# Patient Record
Sex: FEMALE | Race: WHITE | NOT HISPANIC OR LATINO | Employment: OTHER | URBAN - METROPOLITAN AREA
[De-identification: names, ages, dates, MRNs, and addresses within clinical notes are randomized per-mention and may not be internally consistent; named-entity substitution may affect disease eponyms.]

---

## 2019-12-17 ENCOUNTER — TELEPHONE (OUTPATIENT)
Dept: GYNECOLOGIC ONCOLOGY | Facility: CLINIC | Age: 49
End: 2019-12-17

## 2019-12-17 NOTE — TELEPHONE ENCOUNTER
----- Message from Carlos Garcia RN sent at 12/17/2019 12:18 PM CST -----  Would you schedule this patient for a consult with Dr. Zarate for pelvic mass on Monday 12/23 ? Contact her mom, Berta Acosta for appt. [phone]

## 2020-01-17 ENCOUNTER — TELEPHONE (OUTPATIENT)
Dept: GYNECOLOGIC ONCOLOGY | Facility: CLINIC | Age: 50
End: 2020-01-17

## 2020-01-17 NOTE — TELEPHONE ENCOUNTER
----- Message from Carlos Garcia RN sent at 1/16/2020  4:17 PM CST -----  Contact: Mrs. Acosta (Mother)      ----- Message -----  From: Darcie Cox  Sent: 1/16/2020   4:06 PM CST  To: Tessa Acevedo Staff    Name of Who is Calling: Mrs. Acosta (Mother)      What is the request in detail: Would like to speak to staff in regards to wanting to reschedule her appointment on 1/20/2020. States they won't be able to make it at 12:30 PM that day and wanted to see if she can come in at 2:30 PM. Please advise.       Can the clinic reply by [facility]: No      What Number to Call Back if not in [facility]: [phone]

## 2020-01-24 ENCOUNTER — TELEPHONE (OUTPATIENT)
Dept: ADMINISTRATIVE | Facility: OTHER | Age: 50
End: 2020-01-24

## 2020-01-27 ENCOUNTER — INITIAL CONSULT (OUTPATIENT)
Dept: GYNECOLOGIC ONCOLOGY | Facility: CLINIC | Age: 50
End: 2020-01-27
Payer: COMMERCIAL

## 2020-01-27 VITALS — BODY MASS INDEX: 23.4 KG/M2 | WEIGHT: 140.44 LBS | HEIGHT: 65 IN

## 2020-01-27 DIAGNOSIS — R97.1 ELEVATED CA-125: ICD-10-CM

## 2020-01-27 DIAGNOSIS — I82.412 ACUTE DEEP VEIN THROMBOSIS (DVT) OF FEMORAL VEIN OF LEFT LOWER EXTREMITY: ICD-10-CM

## 2020-01-27 DIAGNOSIS — R19.00 PELVIC MASS IN FEMALE: Primary | ICD-10-CM

## 2020-01-27 PROCEDURE — 99999 PR PBB SHADOW E&M-EST. PATIENT-LVL II: CPT | Mod: PBBFAC,,, | Performed by: OBSTETRICS & GYNECOLOGY

## 2020-01-27 PROCEDURE — 99205 OFFICE O/P NEW HI 60 MIN: CPT | Mod: S$PBB,,, | Performed by: OBSTETRICS & GYNECOLOGY

## 2020-01-27 PROCEDURE — 99205 PR OFFICE/OUTPT VISIT, NEW, LEVL V, 60-74 MIN: ICD-10-PCS | Mod: S$PBB,,, | Performed by: OBSTETRICS & GYNECOLOGY

## 2020-01-27 PROCEDURE — 99999 PR PBB SHADOW E&M-EST. PATIENT-LVL II: ICD-10-PCS | Mod: PBBFAC,,, | Performed by: OBSTETRICS & GYNECOLOGY

## 2020-01-27 PROCEDURE — 99212 OFFICE O/P EST SF 10 MIN: CPT | Mod: PBBFAC | Performed by: OBSTETRICS & GYNECOLOGY

## 2020-01-27 RX ORDER — DIAPER,BRIEF,ADULT, DISPOSABLE
1 EACH MISCELLANEOUS 2 TIMES DAILY
COMMUNITY

## 2020-01-27 RX ORDER — MULTIVITAMIN
1 TABLET ORAL 2 TIMES DAILY
COMMUNITY

## 2020-01-27 RX ORDER — MORPHINE SULFATE 15 MG/1
1 TABLET, FILM COATED, EXTENDED RELEASE ORAL 2 TIMES DAILY
COMMUNITY
Start: 2020-01-16

## 2020-01-27 RX ORDER — OXYCODONE AND ACETAMINOPHEN 10; 325 MG/1; MG/1
1 TABLET ORAL 3 TIMES DAILY
COMMUNITY
Start: 2015-04-09

## 2020-01-27 RX ORDER — ERGOCALCIFEROL 1.25 MG/1
1 CAPSULE ORAL 2 TIMES DAILY
COMMUNITY
Start: 2016-09-02

## 2020-01-27 RX ORDER — DEXAMETHASONE 2 MG/1
2 TABLET ORAL 2 TIMES DAILY
COMMUNITY
Start: 2019-11-04

## 2020-01-27 RX ORDER — CLONAZEPAM 2 MG/1
TABLET ORAL 2 TIMES DAILY
COMMUNITY
Start: 2016-10-05

## 2020-01-27 RX ORDER — HYDROXYZINE HYDROCHLORIDE 25 MG/1
1 TABLET, FILM COATED ORAL 2 TIMES DAILY
COMMUNITY
Start: 2016-09-28

## 2020-01-27 RX ORDER — SERTRALINE HYDROCHLORIDE 50 MG/1
1 TABLET, FILM COATED ORAL 2 TIMES DAILY
COMMUNITY
Start: 2016-09-22

## 2020-01-27 RX ORDER — WARFARIN 1 MG/1
3 TABLET ORAL
COMMUNITY
Start: 2020-01-06

## 2020-01-27 RX ORDER — LEVETIRACETAM 750 MG/1
750 TABLET ORAL 2 TIMES DAILY
COMMUNITY
Start: 2019-06-10

## 2020-01-27 NOTE — LETTER
January 27, 2020      Elan Salgado III, MD  [address]  [address]  [place] [address]           [address]  [address], [address]  [place] [address]  Phone: [phone]  Fax: [phone]          Patient: Daniella Acosta   MR Number: [id number]   YOB: 1970   Date of Visit: 1/27/2020       Dear Dr. Elan Salgado III:    Thank you for referring Daniella Acosta to me for evaluation. Attached you will find relevant portions of my assessment and plan of care.    If you have questions, please do not hesitate to call me. I look forward to following Daniella Acosta along with you.    Sincerely,    Curtis Zarate MD    Enclosure  CC:  No Recipients    If you would like to receive this communication electronically, please contact [email or web address] or [phone] to request more information on [employer] Link access.    For providers and/or their staff who would like to refer a patient to [facility], please contact us through our one-stop-shop provider referral line, [facility], at [phone].    If you feel you have received this communication in error or would no longer like to receive these types of communications, please e-mail [email or web address]

## 2020-01-27 NOTE — PROGRESS NOTES
Subjective:      Patient ID: Daniella Acosta is a 49 y.o. female.    Chief Complaint: Consult (Pelvic Mass (Dr. Salgado))      HPI  Here today at the request of Dr. Salgado due to recently detected pelvic mass.  Was sen by him in December after being diagnosed with LLE DVT.  He felt a pelvic mass so ordered TVUS and CT.  Both demonstrated a cystic and solid mass in the pelvis approx 13 cm in size.  There was no ascites.  CA-125 was 93. No FH breast/ovarian/colon cancers.  LMP 2-3 years ago.  [facility] open nick and brain surgery for GBM in 2010.  Has residual gait issues.  Currently on coumadin.  Denies pelvic pain.  Has no bladder or bowel issues.  Review of Systems   Constitutional: Negative for activity change, appetite change, chills, fatigue and fever.   HENT: Negative for hearing loss, mouth sores, nosebleeds, sore throat and tinnitus.    Eyes: Negative for visual disturbance.   Respiratory: Negative for cough, chest tightness, shortness of breath and wheezing.    Cardiovascular: Negative for chest pain and leg swelling.   Gastrointestinal: Negative for abdominal distention, abdominal pain, blood in stool, constipation, diarrhea, nausea and vomiting.   Genitourinary: Negative for dysuria, flank pain, frequency, hematuria, pelvic pain, vaginal bleeding, vaginal discharge and vaginal pain.   Musculoskeletal: Negative for arthralgias and back pain.   Skin: Negative for rash.   Neurological: Negative for dizziness, seizures, syncope, weakness and numbness.   Hematological: Does not bruise/bleed easily.   Psychiatric/Behavioral: Negative for confusion and sleep disturbance. The patient is not nervous/anxious.        Past Medical History:   Diagnosis Date    DVT (deep venous thrombosis)     Seizure disorder      Past Surgical History:   Procedure Laterality Date    APPENDECTOMY      BRAIN SURGERY       Family History   Problem Relation Age of Onset    Hypertension Mother     Gout Father      Social History      Socioeconomic History    Marital status: Single     Spouse name: Not on file    Number of children: Not on file    Years of education: Not on file    Highest education level: Not on file   Occupational History    Not on file   Social Needs    Financial resource strain: Not on file    Food insecurity:     Worry: Not on file     Inability: Not on file    Transportation needs:     Medical: Not on file     Non-medical: Not on file   Tobacco Use    Smoking status: Former Smoker     Years: 10.00     Types: Cigarettes     Last attempt to quit: 1/27/2010     Years since quitting: 10.0    Smokeless tobacco: Never Used    Tobacco comment: Pt reports smoking approx 4 cigarettes/day   Substance and Sexual Activity    Alcohol use: Not Currently    Drug use: Not Currently    Sexual activity: Not on file   Lifestyle    Physical activity:     Days per week: Not on file     Minutes per session: Not on file    Stress: Not on file   Relationships    Social connections:     Talks on phone: Not on file     Gets together: Not on file     Attends Gnosticism service: Not on file     Active member of club or organization: Not on file     Attends meetings of clubs or organizations: Not on file     Relationship status: Not on file   Other Topics Concern    Not on file   Social History Narrative    Not on file     Current Outpatient Medications   Medication Sig    clonazePAM (KLONOPIN) 2 MG Tab Take by mouth 2 (two) times daily. Pt takes 1/2 tablet am and 1 tablet pm    dexAMETHasone (DECADRON) 2 MG tablet Take 2 mg by mouth 2 (two) times daily.    ergocalciferol (ERGOCALCIFEROL) 50,000 unit Cap Take 1 capsule by mouth 2 (two) times daily.    hydrOXYzine HCl (ATARAX) 25 MG tablet Take 1 tablet by mouth 2 (two) times daily.    levETIRAcetam (KEPPRA) 750 MG Tab Take 750 mg by mouth 2 (two) times daily.    lysine (L-LYSINE) 500 mg Tab Take 1 tablet by mouth 2 (two) times daily.    morphine (MS CONTIN) 15 MG 12 hr  tablet Take 1 tablet by mouth 2 (two) times daily.    multivitamin (THERAGRAN) per tablet Take 1 tablet by mouth 2 (two) times daily.    oxyCODONE-acetaminophen (PERCOCET)  mg per tablet Take 1 tablet by mouth 3 (three) times daily.    sertraline (ZOLOFT) 50 MG tablet Take 1 tablet by mouth 2 (two) times daily. Pt take 1.5 tablets in am and 1 tablet in pm    warfarin (COUMADIN) 1 MG tablet Take 3 tablets by mouth every Mon, Wed, Fri, Sun. Monday,Wed, Frid, Sun-TID Tues,Thurs,Sat-4x daily     No current facility-administered medications for this visit.      Review of patient's allergies indicates:   Allergen Reactions    Sulfa (sulfonamide antibiotics) Rash       Objective:   Physical Exam:   Constitutional: She is oriented to person, place, and time. She appears well-developed and well-nourished. No distress.    HENT:   Head: Normocephalic and atraumatic.    Eyes: No scleral icterus.    Neck: Normal range of motion. Neck supple.    Cardiovascular: Normal rate and intact distal pulses.  Exam reveals no cyanosis and no edema.     Pulmonary/Chest: Effort normal. No respiratory distress. She exhibits no tenderness.        Abdominal: Soft. Normal appearance. She exhibits mass. She exhibits no distension, no fluid wave and no ascites. There is no tenderness. There is no rigidity, no rebound and no guarding. No hernia.   Mass palpable in lower pelvis, mobile     Genitourinary: Rectum normal, vagina normal and uterus normal. Pelvic exam was performed with patient supine. There is no rash, tenderness or lesion on the right labia. There is no rash, tenderness or lesion on the left labia. Uterus is not deviated, not enlarged, not fixed, not tender, not hosting fibroids and not experiencing uterine prolapse. Cervix is normal. Right adnexum displays no mass, no tenderness and no fullness. Left adnexum displays mass and fullness. Left adnexum displays no tenderness. No bleeding in the vagina. No vaginal discharge  found. Labial bartholins normal.Cervix exhibits no motion tenderness, no discharge and no friability.   Genitourinary Comments: Mass smooth and mobile, does not feel fixed           Musculoskeletal: Normal range of motion and moves all extremeties. She exhibits no edema.      Lymphadenopathy:     She has no cervical adenopathy.        Right: No inguinal adenopathy present.        Left: No inguinal adenopathy present.    Neurological: She is alert and oriented to person, place, and time.    Skin: Skin is warm. No rash noted. No cyanosis or erythema.    Psychiatric: She has a normal mood and affect. Thought content normal.       Assessment:     1. Pelvic mass in female    2. Elevated CA-125    3. Acute deep vein thrombosis (DVT) of femoral vein of left lower extremity        Plan:       Explained exam and radiologic findings to the patient and her mother.  Given size, DVT due to vein compression, and elevated CA-125, I recommend excision.  I think we can do this robotically, and counseled them as such.  Since she is , plan RALH/BSO/Mass resection and possible staging. The risks, benefits, and indications of the procedure were discussed with the patient and her family member.  These included bleeding, infection, damage to surrounding tissues, conversion to laparotomy, and the possibility of major complications including death.  She voiced understanding, all questions were answered and consents were signed. Will arrange for her to have IVC filter placed prior to surgery.  Will also discuss rationale behind coumadin versus Xarelto with her primary MD.

## 2020-01-27 NOTE — Clinical Note
Tonny Rodriguez:Hope you are well.  Is there any way you can place an IVC filter in this lady?  Pretty extensive clot in the left venous system due to pelvic mass.  I think she was imaged at [facility] so I am happy to repeat imaging on her if needed.  She is also on Coumadin so let me know how you want to manage that.  Thanks in advance.  IVÁN

## 2020-01-29 ENCOUNTER — TELEPHONE (OUTPATIENT)
Dept: GYNECOLOGIC ONCOLOGY | Facility: CLINIC | Age: 50
End: 2020-01-29

## 2020-01-29 ENCOUNTER — TELEPHONE (OUTPATIENT)
Dept: ADMINISTRATIVE | Facility: OTHER | Age: 50
End: 2020-01-29

## 2020-01-29 DIAGNOSIS — D49.59 OVARIAN NEOPLASM: ICD-10-CM

## 2020-01-29 NOTE — TELEPHONE ENCOUNTER
----- Message from Darcie Perezmfield sent at 1/29/2020 11:08 AM CST -----  Contact: Dr. Tidwell  Name of Who is Calling: Dr. Tidwell      What is the request in detail: Would like to speak to Dr. Zarate in regards to a procedure she is supposed to have in [place].       Can the clinic reply by [facility]: No      What Number to Call Back if not in [facility]: [phone]

## 2020-01-30 ENCOUNTER — TELEPHONE (OUTPATIENT)
Dept: ADMINISTRATIVE | Facility: OTHER | Age: 50
End: 2020-01-30

## 2020-02-05 ENCOUNTER — TELEPHONE (OUTPATIENT)
Dept: ADMINISTRATIVE | Facility: OTHER | Age: 50
End: 2020-02-05

## 2020-02-10 ENCOUNTER — TELEPHONE (OUTPATIENT)
Dept: RADIOLOGY | Facility: HOSPITAL | Age: 50
End: 2020-02-10

## 2020-02-11 ENCOUNTER — HOSPITAL ENCOUNTER (OUTPATIENT)
Dept: RADIOLOGY | Facility: HOSPITAL | Age: 50
Discharge: HOME | End: 2020-02-11
Attending: OBSTETRICS & GYNECOLOGY
Payer: MEDICARE

## 2020-02-11 DIAGNOSIS — D49.59 OVARIAN NEOPLASM: ICD-10-CM

## 2020-02-11 PROCEDURE — 74177 CT ABD & PELVIS W/CONTRAST: CPT | Mod: TC

## 2020-02-11 PROCEDURE — 74177 CT ABDOMEN PELVIS WITH CONTRAST: ICD-10-PCS | Mod: 26,,, | Performed by: RADIOLOGY

## 2020-02-11 PROCEDURE — 74177 CT ABD & PELVIS W/CONTRAST: CPT | Mod: 26,,, | Performed by: RADIOLOGY

## 2020-02-11 PROCEDURE — 25500020 PHARM REV CODE 255: Performed by: OBSTETRICS & GYNECOLOGY

## 2020-02-11 RX ADMIN — IOHEXOL 30 ML: 350 INJECTION, SOLUTION INTRAVENOUS at 09:02

## 2020-02-11 RX ADMIN — IOHEXOL 75 ML: 350 INJECTION, SOLUTION INTRAVENOUS at 10:02

## 2020-02-14 ENCOUNTER — TELEPHONE (OUTPATIENT)
Dept: GYNECOLOGIC ONCOLOGY | Facility: CLINIC | Age: 50
End: 2020-02-14

## 2020-02-14 DIAGNOSIS — R19.00 PELVIC MASS IN FEMALE: Primary | ICD-10-CM

## 2020-02-14 NOTE — TELEPHONE ENCOUNTER
----- Message from Delores Richmond sent at 2/14/2020 11:31 AM CST -----  Contact: patient mother  [phone]-please call above patient mother at number in message need to speak with the nurse concerning test results waiting on a call back thanks.

## 2020-02-19 DIAGNOSIS — R19.00 PELVIC MASS IN FEMALE: Primary | ICD-10-CM

## 2020-02-20 ENCOUNTER — TELEPHONE (OUTPATIENT)
Dept: RADIOLOGY | Facility: HOSPITAL | Age: 50
End: 2020-02-20

## 2020-02-21 DIAGNOSIS — R19.00 PELVIC MASS IN FEMALE: ICD-10-CM

## 2020-02-21 DIAGNOSIS — I82.412 ACUTE DEEP VEIN THROMBOSIS (DVT) OF FEMORAL VEIN OF LEFT LOWER EXTREMITY: Primary | ICD-10-CM

## 2020-02-28 ENCOUNTER — TELEPHONE (OUTPATIENT)
Dept: GYNECOLOGIC ONCOLOGY | Facility: CLINIC | Age: 50
End: 2020-02-28

## 2020-02-28 NOTE — TELEPHONE ENCOUNTER
----- Message from Margarita Edouard sent at 2/28/2020  4:52 PM CST -----  Type:  Patient Returning Call    Who Called: pt mom   Who Left Message for Patient: pt mom Inge  Does the patient know what this is regarding?: mom want a call   Would the patient rather a call back or a response via [facility]?  Call   Best Call Back Number:[phone]  Additional Information:  Call back

## 2020-03-02 ENCOUNTER — TELEPHONE (OUTPATIENT)
Dept: RADIOLOGY | Facility: HOSPITAL | Age: 50
End: 2020-03-02

## 2020-03-05 ENCOUNTER — HOSPITAL ENCOUNTER (OUTPATIENT)
Facility: HOSPITAL | Age: 50
Discharge: HOME | End: 2020-03-05
Attending: RADIOLOGY | Admitting: RADIOLOGY
Payer: MEDICARE

## 2020-03-05 VITALS
DIASTOLIC BLOOD PRESSURE: 79 MMHG | HEART RATE: 81 BPM | BODY MASS INDEX: 21.66 KG/M2 | HEIGHT: 65 IN | SYSTOLIC BLOOD PRESSURE: 109 MMHG | RESPIRATION RATE: 17 BRPM | WEIGHT: 130 LBS | TEMPERATURE: 98 F | OXYGEN SATURATION: 96 %

## 2020-03-05 DIAGNOSIS — R19.00 PELVIC MASS IN FEMALE: ICD-10-CM

## 2020-03-05 DIAGNOSIS — R19.00 PELVIC MASS: ICD-10-CM

## 2020-03-05 PROCEDURE — 63600175 PHARM REV CODE 636 W HCPCS: Performed by: RADIOLOGY

## 2020-03-05 PROCEDURE — C1769 GUIDE WIRE: HCPCS | Performed by: RADIOLOGY

## 2020-03-05 PROCEDURE — C1880 VENA CAVA FILTER: HCPCS | Performed by: RADIOLOGY

## 2020-03-05 PROCEDURE — 63600175 PHARM REV CODE 636 W HCPCS

## 2020-03-05 PROCEDURE — 25500020 PHARM REV CODE 255

## 2020-03-05 DEVICE — OPTION ELITE RETRIEVABLE VENA CAVA FILTER SUITABLE FOR JUGULAR OR FEMORAL DELIVERY - STERILIZED AT STERIGENICS
Type: IMPLANTABLE DEVICE | Site: VENA CAVA | Status: FUNCTIONAL
Brand: OPTION ELITE RETRIEVABLE VENA CAVA FILTER

## 2020-03-05 RX ORDER — HYDROMORPHONE HYDROCHLORIDE 1 MG/ML
INJECTION, SOLUTION INTRAMUSCULAR; INTRAVENOUS; SUBCUTANEOUS
Status: COMPLETED | OUTPATIENT
Start: 2020-03-05 | End: 2020-03-05

## 2020-03-05 RX ORDER — FENTANYL CITRATE 50 UG/ML
INJECTION, SOLUTION INTRAMUSCULAR; INTRAVENOUS
Status: COMPLETED | OUTPATIENT
Start: 2020-03-05 | End: 2020-03-05

## 2020-03-05 RX ORDER — MIDAZOLAM HYDROCHLORIDE 1 MG/ML
INJECTION INTRAMUSCULAR; INTRAVENOUS
Status: COMPLETED | OUTPATIENT
Start: 2020-03-05 | End: 2020-03-05

## 2020-03-05 RX ADMIN — HYDROMORPHONE HYDROCHLORIDE 1 MG: 1 INJECTION, SOLUTION INTRAMUSCULAR; INTRAVENOUS; SUBCUTANEOUS at 01:03

## 2020-03-05 RX ADMIN — MIDAZOLAM HYDROCHLORIDE 2 MG: 1 INJECTION, SOLUTION INTRAMUSCULAR; INTRAVENOUS at 01:03

## 2020-03-05 RX ADMIN — FENTANYL CITRATE 100 MCG: 50 INJECTION, SOLUTION INTRAMUSCULAR; INTRAVENOUS at 01:03

## 2020-03-05 RX ADMIN — MIDAZOLAM HYDROCHLORIDE 1 MG: 1 INJECTION, SOLUTION INTRAMUSCULAR; INTRAVENOUS at 01:03

## 2020-03-05 NOTE — PLAN OF CARE
Patient discharged home with mom in private vehicle, IV removed, reviewed discharge instructions, verbalized understanding, covaderm clean, dry and intact to right groin, ambulated to restroom with steady gait.

## 2020-03-05 NOTE — DISCHARGE INSTRUCTIONS
"Post-op     1. DIET: It is advisable for you to follow a diet that limits the intake of salt, sugar, saturated fats and cholesterol.     2. FOR THE NEXT 24 HOURS:   · For the next 8 hours, you should be watched by a responsible adult. This person should make sure your condition is not getting worse.   · Don't drink any alcohol for the next 24 hours.  · Don't drive, operate dangerous machinery, or make important business or personal decisions during the next 24 hours.  · Your healthcare provider may tell you not to take any medicine by mouth for pain or sleep in the next 4 hours. These medicines may react with the medicines you were given in the hospital. This could cause a much stronger response than usual.       3. ACTIVITY:                                Day of discharge:             NO vigorous activity, lifting or straining                                                   Day after discharge:         Avoid heavy lifting (up to 10 lbs)                                                                        The day after discharge you may shower,                but avoid tub baths for 5 days                                                                         Wash site gently with soap and water        NO powder or lotion to your procedure site.                 Before you shower, remove dressing, apply       bandaid if desired                                                                                                                                                                            2nd day after discharge:  Resume normal activities                                                                         Exercise program as instructed      4. WOUND CARE: It is not unusual to have a small amount of bruising appear in the groin area. It is also common to have a tender "knot" develop beneath the skin at the puncture site in the groin. This is scar tissue only and is not a cause for concern or alarm. " "This tender knot may take several weeks to fully resolve. The bruise will usually spread over several days. If the lump gets bigger, call you doctor immediately.    5. DISCOMFORT: For general discomfort at the puncture site, you may take 1 or 2 Acetaminophen (Tylenol) tablets every 4 hours as needed. (Do not take more than 4000 mg a day)                 6. CALL YOUR HEALTHCARE PROVIDER IF YOU START TO HAVE THE FOLLOWING SYMPTOMS:        1. Problems with the affected leg: Pain, discomfort, loss of warmth, numbness                     or tingling                                                                                                                            2. Problems at the groin site: Bleeding, pain that is sudden/sharp/persistent,                   swelling at site or a change in "lump" size, increased redness or drainage at                     puncture site                                                               3. High fever (101 degrees or higher)       4.  Drowsiness that doesn't get better       5. Weakness or dizziness that doesn't get better            6. Repeated vomiting        7. GO TO  THE EMERGENCY ROOM OR CALL 911 IF YOU HAVE: Chest pains or discomforts not relieved with 3 nitroglycerin doses (sublingual tablets or spray), numbness or severe pain or if your foot or leg becomes cold or discolored or uncontrolled bleeding from site (apply direct pressure above site).  "

## 2020-03-05 NOTE — DISCHARGE SUMMARY
Radiology Discharge Summary      Hospital Course: No complications    Admit Date: 3/5/2020  Discharge Date: 03/05/2020     Instructions Given to Patient: Yes  Diet: regular diet and Resume prior diet  Activity: activity as tolerated and no driving for today    Description of Condition on Discharge: Stable  Vital Signs (Most Recent): Temp: 97.7 °F (36.5 °C) (03/05/20 1216)  Pulse: 80 (03/05/20 1216)  Resp: 16 (03/05/20 1216)  BP: 100/74 (03/05/20 1217)  SpO2: 96 % (03/05/20 1216)    Discharge Disposition: Home    Discharge Diagnosis: dvt;  preop filter for surgery     Follow-up: with dr liane Enamorado MD  Staff Radiologist  Department of Radiology  Pager: [phone]

## 2020-03-05 NOTE — H&P
[facility] -   History & Physical    Subjective:      Chief Complaint/Reason for Admission: DVT    Daniella Acosta is a 49 y.o. female.    Past Medical History:   Diagnosis Date    DVT (deep venous thrombosis)     Glioblastoma     Seizure disorder      Past Surgical History:   Procedure Laterality Date    APPENDECTOMY      BRAIN SURGERY       Family History   Problem Relation Age of Onset    Hypertension Mother     Gout Father      Social History     Tobacco Use    Smoking status: Former Smoker     Years: 10.00     Types: Cigarettes     Last attempt to quit: 1/27/2010     Years since quitting: 10.1    Smokeless tobacco: Never Used    Tobacco comment: Pt reports smoking approx 4 cigarettes/day   Substance Use Topics    Alcohol use: Not Currently    Drug use: Not Currently       PTA Medications   Medication Sig    clonazePAM (KLONOPIN) 2 MG Tab Take by mouth 2 (two) times daily. Pt takes 1/2 tablet am and 1 tablet pm    dexAMETHasone (DECADRON) 2 MG tablet Take 2 mg by mouth 2 (two) times daily.    ergocalciferol (ERGOCALCIFEROL) 50,000 unit Cap Take 1 capsule by mouth 2 (two) times daily.    hydrOXYzine HCl (ATARAX) 25 MG tablet Take 1 tablet by mouth 2 (two) times daily.    levETIRAcetam (KEPPRA) 750 MG Tab Take 750 mg by mouth 2 (two) times daily.    lysine (L-LYSINE) 500 mg Tab Take 1 tablet by mouth 2 (two) times daily.    morphine (MS CONTIN) 15 MG 12 hr tablet Take 1 tablet by mouth 2 (two) times daily.    multivitamin (THERAGRAN) per tablet Take 1 tablet by mouth 2 (two) times daily.    oxyCODONE-acetaminophen (PERCOCET)  mg per tablet Take 1 tablet by mouth 3 (three) times daily.    sertraline (ZOLOFT) 50 MG tablet Take 1 tablet by mouth 2 (two) times daily. Pt take 1.5 tablets in am and 1 tablet in pm    warfarin (COUMADIN) 1 MG tablet Take 3 tablets by mouth every Mon, Wed, Fri, Sun. Monday,Wed, Frid, Sun-TID Tues,Thurs,Sat-4x daily     Review of patient's  allergies indicates:   Allergen Reactions    Sulfa (sulfonamide antibiotics) Rash        Review of Systems   Eyes: Negative.    Respiratory: Negative.    Cardiovascular: Negative.    Gastrointestinal: Negative.        Objective:      Vital Signs (Most Recent)  Temp: 97.7 °F (36.5 °C) (03/05/20 1216)  Pulse: 80 (03/05/20 1216)  Resp: 16 (03/05/20 1216)  BP: 100/74 (03/05/20 1217)  SpO2: 96 % (03/05/20 1216)    Vital Signs Range (Last 24H):  Temp:  [97.7 °F (36.5 °C)]   Pulse:  [80]   Resp:  [16]   BP: (100)/(74)   SpO2:  [96 %]     Physical Exam   Constitutional: She appears well-nourished.   HENT:   Head: Atraumatic.   Eyes: EOM are normal.   Abdominal: Soft.       Data Review:    CBC:   Lab Results   Component Value Date    WBC 12.79 (H) 03/05/2020    RBC 3.99 (L) 03/05/2020    HGB 11.1 (L) 03/05/2020    HCT 36.0 (L) 03/05/2020     (H) 03/05/2020      ECG: no prior ECG.     Assessment:      Active Hospital Problems    Diagnosis  POA    Pelvic mass [R19.00]  Yes     Priority: High      Resolved Hospital Problems   No resolved problems to display.       Plan:    ivc filter

## 2020-03-12 ENCOUNTER — TELEPHONE (OUTPATIENT)
Dept: GYNECOLOGIC ONCOLOGY | Facility: CLINIC | Age: 50
End: 2020-03-12

## 2020-03-12 DIAGNOSIS — R19.00 PELVIC MASS IN FEMALE: Primary | ICD-10-CM

## 2020-03-12 NOTE — TELEPHONE ENCOUNTER
----- Message from Cynthia Peck sent at 3/12/2020  9:31 AM CDT -----  Contact: Inge pt mother called   Inge pt mother called and said  is supposed to schedule a surgery for Pt     Inge wants someone to call her regarding surgery     Inge can be reached at [phone]

## 2020-03-17 ENCOUNTER — TELEPHONE (OUTPATIENT)
Dept: PREADMISSION TESTING | Facility: HOSPITAL | Age: 50
End: 2020-03-17

## 2020-03-17 RX ORDER — HYDROGEN PEROXIDE 3 %
20 SOLUTION, NON-ORAL MISCELLANEOUS
COMMUNITY

## 2020-03-17 NOTE — TELEPHONE ENCOUNTER
This chart show her as in [facility] CATH LAB. Spoke with patient mother. This patient in a living center and the mother will have her with her.

## 2020-03-17 NOTE — PRE-PROCEDURE INSTRUCTIONS
Preop and medication instructions given and reviewed;  Patient verbalized understanding.  Patient notified Anesthesia will conduct interview via phone.

## 2020-03-17 NOTE — TELEPHONE ENCOUNTER
----- Message from Erin Alvarado RN sent at 3/17/2020 10:19 AM CDT -----  Surgery date: 3/24/2020  PreOp appt: N/A    Please call Pt and schedule the following preop appts:    POC (via phone)  Lab    Thank you!  Erin

## 2020-03-17 NOTE — ANESTHESIA PAT ROS NOTE
03/17/2020  Daniella Acosta is a 49 y.o., female.      Pre-op Assessment         Review of Systems  Anesthesia Hx:  Hx of Anesthetic complications HARD TO SEDATE-pt on morphine and percocet daily for chronic pain  Denies Family Hx of Anesthesia complications.  Personal Hx of Anesthesia complications   Social:  Former Smoker, No Alcohol Use    Hematology/Oncology:         -- Transfusion: -- Transfusion Hx (no complications) (Vit K def at birth,required transfusion): --  Cancer in past history (Hx glioblastoma 2010-stable at this time): s/p surgery, s/p chemotherapy and s/p radiation therapy   Oncology Comments: Pelvic mass/elevated CA-125     EENT/Dental:EENT/Dental Normal   Cardiovascular:    Denies Angina.  Functional Capacity 3 METS  Deep Venous Thrombosis (DVT) (pt had IVC filter inserted last week), recent DVT (<6 months), left lower extremity, right lower extremity    Pulmonary:  Pulmonary Normal  Denies Shortness of breath.  Denies Recent URI.    Renal/:  Renal/ Normal     Hepatic/GI:  Esophageal / Stomach Disorders Gerd Controlled by PRN antireflux medication.    Musculoskeletal:  Musculoskeletal General/Symptoms: Functional capacity is ambulatory with walker.    Neurological:  Dx of Headaches Pain , onset is chronic , location of head & toe , alleviating factors are morphine 2/day, percocet 3/day. Seizure Disorder , Most recent seizure occurred 1-6  months ago  Brain Tumor, Glioblastoma (2010 radiation, surgery  & chemo-stable now)   Endocrine:  Endocrine Normal    Dermatological:  Pinky toe black  Psych:  Psychiatric Normal           Physical Exam   Airway/Jaw/Neck:  Airway Findings: Jaw/Neck Findings: (per pt) Neck ROM: Normal ROM      Dental:  Dental Findings: In tact           3/20/20 POC appt completed by phone. Coumadin instructions/lab results noted in media.  3/23/20 pt did not go to  3/20 lab appt, eleazar get CMP and T & S am of surgery.    Anesthesia Assessment: Preoperative EQUATION    Planned Procedure: Procedure(s) (LRB):  XI ROBOTIC HYSTERECTOMY (N/A)  XI ROBOTIC SALPINGO-OOPHORECTOMY (Bilateral)  Requested Anesthesia Type:General  Surgeon: Curtis Zarate MD  Service: OB/GYN  Known or anticipated Date of Surgery:3/24/2020    Surgeon notes: reviewed    Previous anesthesia records:Not available    Last PCP note: within 1 month , outside [facility]   Subspecialty notes: Gyn/ONC, Hematology/Oncology, Pain Management, Vascular Surgery    Other important co-morbidities:Per Epic:  Hx DVT, Sz disorder, IVC filter (3/20), Hx Glioblastoma      Tests already available:  Available tests,  within 1 month , within [facility] .  3/5/2020 PT/INR, PTT, CBC, PTA; CE: 7/17/2019 EKG (no tracing)            Instructions given. (See in Nurse's note)    Optimization:  Anesthesia Preop Clinic Assessment  Indicated. (via phone)    Medical Opinion Indicated.      Plan:    Testing:  CMP, PT/INR, PTT and T&S   Pre-anesthesia  visit       Visit focus: concerns in complex and/or prolonged anesthesia, position other than supine, post-operative pain control for chronic pain patient     Consultation:Patient's PCP for a statement of optimization and Coumadin instructions from OS Dr. Tidwell     Patient  has previously scheduled Medical Appointment: Not at this time prior to surgery    Navigation: Tests Scheduled.              Consults scheduled.             Results will be tracked by Preop Clinic.    3/19/2020  OS records received from PCP, including most recent lab, COumadin instructions, EKG and visit note from 3/18/2020.  (scanned to media)

## 2020-03-23 ENCOUNTER — ANESTHESIA EVENT (OUTPATIENT)
Dept: SURGERY | Facility: HOSPITAL | Age: 50
DRG: 737 | End: 2020-03-23
Payer: MEDICARE

## 2020-03-24 ENCOUNTER — HOSPITAL ENCOUNTER (INPATIENT)
Facility: HOSPITAL | Age: 50
LOS: 1 days | Discharge: HOME | DRG: 737 | End: 2020-03-25
Attending: OBSTETRICS & GYNECOLOGY | Admitting: OBSTETRICS & GYNECOLOGY
Payer: MEDICARE

## 2020-03-24 ENCOUNTER — TELEPHONE (OUTPATIENT)
Dept: GYNECOLOGIC ONCOLOGY | Facility: CLINIC | Age: 50
End: 2020-03-24

## 2020-03-24 ENCOUNTER — ANESTHESIA (OUTPATIENT)
Dept: SURGERY | Facility: HOSPITAL | Age: 50
DRG: 737 | End: 2020-03-24
Payer: MEDICARE

## 2020-03-24 DIAGNOSIS — R97.1 ELEVATED CA-125: ICD-10-CM

## 2020-03-24 DIAGNOSIS — Z90.710 STATUS POST HYSTERECTOMY: ICD-10-CM

## 2020-03-24 DIAGNOSIS — R19.00 PELVIC MASS IN FEMALE: ICD-10-CM

## 2020-03-24 DIAGNOSIS — Z90.710 STATUS POST HYSTERECTOMY: Primary | ICD-10-CM

## 2020-03-24 DIAGNOSIS — Z01.818 PREOPERATIVE TESTING: ICD-10-CM

## 2020-03-24 DIAGNOSIS — I82.412 ACUTE DEEP VEIN THROMBOSIS (DVT) OF FEMORAL VEIN OF LEFT LOWER EXTREMITY: ICD-10-CM

## 2020-03-24 DIAGNOSIS — L81.9 DISCOLORATION OF SKIN OF TOE: ICD-10-CM

## 2020-03-24 DIAGNOSIS — R19.00 PELVIC MASS: ICD-10-CM

## 2020-03-24 PROBLEM — I82.512 CHRONIC DEEP VEIN THROMBOSIS (DVT) OF FEMORAL VEIN OF LEFT LOWER EXTREMITY: Status: ACTIVE | Noted: 2020-01-27

## 2020-03-24 PROBLEM — F32.A ANXIETY AND DEPRESSION: Status: ACTIVE | Noted: 2020-03-24

## 2020-03-24 PROBLEM — C71.9 GLIOBLASTOMA MULTIFORME: Status: ACTIVE | Noted: 2020-03-24

## 2020-03-24 PROBLEM — I96 GANGRENE OF TOE OF LEFT FOOT: Status: ACTIVE | Noted: 2020-03-24

## 2020-03-24 PROBLEM — F41.9 ANXIETY AND DEPRESSION: Status: ACTIVE | Noted: 2020-03-24

## 2020-03-24 LAB
ABO + RH BLD: NORMAL
APTT BLDCRRT: 21 SEC (ref 21–32)
BLD GP AB SCN CELLS X3 SERPL QL: NORMAL
INR PPP: 1 (ref 0.8–1.2)
POCT GLUCOSE: 83 MG/DL (ref 70–110)
PROTHROMBIN TIME: 10.3 SEC (ref 9–12.5)

## 2020-03-24 PROCEDURE — 63600175 PHARM REV CODE 636 W HCPCS: Performed by: STUDENT IN AN ORGANIZED HEALTH CARE EDUCATION/TRAINING PROGRAM

## 2020-03-24 PROCEDURE — 58571 PR LAPAROSCOPY W TOT HYSTERECTUTERUS <=250 GRAM  W TUBE/OVARY: ICD-10-PCS | Mod: GC,,, | Performed by: OBSTETRICS & GYNECOLOGY

## 2020-03-24 PROCEDURE — 88341 PR IHC OR ICC EACH ADD'L SINGLE ANTIBODY  STAINPR: ICD-10-PCS | Mod: 26,,, | Performed by: PATHOLOGY

## 2020-03-24 PROCEDURE — 88305 TISSUE EXAM BY PATHOLOGIST: CPT | Performed by: PATHOLOGY

## 2020-03-24 PROCEDURE — 88305 TISSUE EXAM BY PATHOLOGIST: CPT | Mod: 26,,, | Performed by: PATHOLOGY

## 2020-03-24 PROCEDURE — 58571 TLH W/T/O 250 G OR LESS: CPT | Mod: AS,GC,, | Performed by: NURSE PRACTITIONER

## 2020-03-24 PROCEDURE — 88342 IMHCHEM/IMCYTCHM 1ST ANTB: CPT | Mod: 26,,, | Performed by: PATHOLOGY

## 2020-03-24 PROCEDURE — 88341 IMHCHEM/IMCYTCHM EA ADD ANTB: CPT | Mod: 26,,, | Performed by: PATHOLOGY

## 2020-03-24 PROCEDURE — 99223 1ST HOSP IP/OBS HIGH 75: CPT | Mod: ,,, | Performed by: SURGERY

## 2020-03-24 PROCEDURE — 86900 BLOOD TYPING SEROLOGIC ABO: CPT

## 2020-03-24 PROCEDURE — 37000009 HC ANESTHESIA EA ADD 15 MINS: Performed by: OBSTETRICS & GYNECOLOGY

## 2020-03-24 PROCEDURE — 58571 PR LAPAROSCOPY W TOT HYSTERECTUTERUS <=250 GRAM  W TUBE/OVARY: ICD-10-PCS | Mod: AS,GC,, | Performed by: NURSE PRACTITIONER

## 2020-03-24 PROCEDURE — 82962 GLUCOSE BLOOD TEST: CPT | Performed by: OBSTETRICS & GYNECOLOGY

## 2020-03-24 PROCEDURE — 63600175 PHARM REV CODE 636 W HCPCS: Performed by: ANESTHESIOLOGY

## 2020-03-24 PROCEDURE — D9220A PRA ANESTHESIA: Mod: CRNA,,, | Performed by: NURSE ANESTHETIST, CERTIFIED REGISTERED

## 2020-03-24 PROCEDURE — 37000008 HC ANESTHESIA 1ST 15 MINUTES: Performed by: OBSTETRICS & GYNECOLOGY

## 2020-03-24 PROCEDURE — 88342 CHG IMMUNOCYTOCHEMISTRY: ICD-10-PCS | Mod: 26,,, | Performed by: PATHOLOGY

## 2020-03-24 PROCEDURE — 27201423 OPTIME MED/SURG SUP & DEVICES STERILE SUPPLY: Performed by: OBSTETRICS & GYNECOLOGY

## 2020-03-24 PROCEDURE — 99223 PR INITIAL HOSPITAL CARE,LEVL III: ICD-10-PCS | Mod: ,,, | Performed by: SURGERY

## 2020-03-24 PROCEDURE — 71000015 HC POSTOP RECOV 1ST HR: Performed by: OBSTETRICS & GYNECOLOGY

## 2020-03-24 PROCEDURE — 85730 THROMBOPLASTIN TIME PARTIAL: CPT

## 2020-03-24 PROCEDURE — 63600175 PHARM REV CODE 636 W HCPCS: Performed by: OBSTETRICS & GYNECOLOGY

## 2020-03-24 PROCEDURE — 88305 TISSUE EXAM BY PATHOLOGIST: ICD-10-PCS | Mod: 26,,, | Performed by: PATHOLOGY

## 2020-03-24 PROCEDURE — 36000712 HC OR TIME LEV V 1ST 15 MIN: Performed by: OBSTETRICS & GYNECOLOGY

## 2020-03-24 PROCEDURE — 63600175 PHARM REV CODE 636 W HCPCS: Performed by: NURSE ANESTHETIST, CERTIFIED REGISTERED

## 2020-03-24 PROCEDURE — 88341 IMHCHEM/IMCYTCHM EA ADD ANTB: CPT | Mod: 59 | Performed by: PATHOLOGY

## 2020-03-24 PROCEDURE — 25000003 PHARM REV CODE 250: Performed by: NURSE ANESTHETIST, CERTIFIED REGISTERED

## 2020-03-24 PROCEDURE — 71000033 HC RECOVERY, INTIAL HOUR: Performed by: OBSTETRICS & GYNECOLOGY

## 2020-03-24 PROCEDURE — 88307 PR  SURG PATH,LEVEL V: ICD-10-PCS | Mod: 26,,, | Performed by: PATHOLOGY

## 2020-03-24 PROCEDURE — 71000039 HC RECOVERY, EACH ADD'L HOUR: Performed by: OBSTETRICS & GYNECOLOGY

## 2020-03-24 PROCEDURE — D9220A PRA ANESTHESIA: ICD-10-PCS | Mod: CRNA,,, | Performed by: NURSE ANESTHETIST, CERTIFIED REGISTERED

## 2020-03-24 PROCEDURE — 93922 UPR/L XTREMITY ART 2 LEVELS: CPT | Performed by: SURGERY

## 2020-03-24 PROCEDURE — 20600001 HC STEP DOWN PRIVATE ROOM

## 2020-03-24 PROCEDURE — 36000713 HC OR TIME LEV V EA ADD 15 MIN: Performed by: OBSTETRICS & GYNECOLOGY

## 2020-03-24 PROCEDURE — 25000003 PHARM REV CODE 250: Performed by: OBSTETRICS & GYNECOLOGY

## 2020-03-24 PROCEDURE — 88307 TISSUE EXAM BY PATHOLOGIST: CPT | Mod: 26,,, | Performed by: PATHOLOGY

## 2020-03-24 PROCEDURE — D9220A PRA ANESTHESIA: Mod: ANES,,, | Performed by: ANESTHESIOLOGY

## 2020-03-24 PROCEDURE — 88112 CYTOPATH CELL ENHANCE TECH: CPT | Mod: 26,,, | Performed by: PATHOLOGY

## 2020-03-24 PROCEDURE — 36415 COLL VENOUS BLD VENIPUNCTURE: CPT

## 2020-03-24 PROCEDURE — 88112 CYTOPATH CELL ENHANCE TECH: CPT | Performed by: PATHOLOGY

## 2020-03-24 PROCEDURE — 94761 N-INVAS EAR/PLS OXIMETRY MLT: CPT

## 2020-03-24 PROCEDURE — 88342 IMHCHEM/IMCYTCHM 1ST ANTB: CPT | Mod: 91 | Performed by: PATHOLOGY

## 2020-03-24 PROCEDURE — D9220A PRA ANESTHESIA: ICD-10-PCS | Mod: ANES,,, | Performed by: ANESTHESIOLOGY

## 2020-03-24 PROCEDURE — 88307 TISSUE EXAM BY PATHOLOGIST: CPT | Performed by: PATHOLOGY

## 2020-03-24 PROCEDURE — 88342 IMHCHEM/IMCYTCHM 1ST ANTB: CPT | Performed by: PATHOLOGY

## 2020-03-24 PROCEDURE — 85610 PROTHROMBIN TIME: CPT

## 2020-03-24 PROCEDURE — 88112 PR  CYTOPATH, CELL ENHANCE TECH: ICD-10-PCS | Mod: 26,,, | Performed by: PATHOLOGY

## 2020-03-24 PROCEDURE — 63600175 PHARM REV CODE 636 W HCPCS

## 2020-03-24 PROCEDURE — 25000003 PHARM REV CODE 250: Performed by: STUDENT IN AN ORGANIZED HEALTH CARE EDUCATION/TRAINING PROGRAM

## 2020-03-24 PROCEDURE — 58571 TLH W/T/O 250 G OR LESS: CPT | Mod: GC,,, | Performed by: OBSTETRICS & GYNECOLOGY

## 2020-03-24 RX ORDER — SODIUM CHLORIDE 9 MG/ML
INJECTION, SOLUTION INTRAVENOUS CONTINUOUS
Status: DISCONTINUED | OUTPATIENT
Start: 2020-03-24 | End: 2020-03-24

## 2020-03-24 RX ORDER — LIDOCAINE HCL/PF 100 MG/5ML
SYRINGE (ML) INTRAVENOUS
Status: DISCONTINUED | OUTPATIENT
Start: 2020-03-24 | End: 2020-03-24

## 2020-03-24 RX ORDER — SERTRALINE HYDROCHLORIDE 50 MG/1
50 TABLET, FILM COATED ORAL 2 TIMES DAILY
Status: DISCONTINUED | OUTPATIENT
Start: 2020-03-24 | End: 2020-03-25 | Stop reason: HOSPADM

## 2020-03-24 RX ORDER — SODIUM CHLORIDE 0.9 % (FLUSH) 0.9 %
10 SYRINGE (ML) INJECTION
Status: DISCONTINUED | OUTPATIENT
Start: 2020-03-24 | End: 2020-03-24 | Stop reason: HOSPADM

## 2020-03-24 RX ORDER — OXYCODONE AND ACETAMINOPHEN 10; 325 MG/1; MG/1
1 TABLET ORAL EVERY 8 HOURS PRN
Status: DISCONTINUED | OUTPATIENT
Start: 2020-03-24 | End: 2020-03-24

## 2020-03-24 RX ORDER — LIDOCAINE HYDROCHLORIDE 10 MG/ML
1 INJECTION, SOLUTION EPIDURAL; INFILTRATION; INTRACAUDAL; PERINEURAL ONCE
Status: DISCONTINUED | OUTPATIENT
Start: 2020-03-24 | End: 2020-03-24

## 2020-03-24 RX ORDER — MUPIROCIN 20 MG/G
OINTMENT TOPICAL
Status: DISCONTINUED | OUTPATIENT
Start: 2020-03-24 | End: 2020-03-24

## 2020-03-24 RX ORDER — FENTANYL CITRATE 50 UG/ML
INJECTION, SOLUTION INTRAMUSCULAR; INTRAVENOUS
Status: DISCONTINUED | OUTPATIENT
Start: 2020-03-24 | End: 2020-03-24

## 2020-03-24 RX ORDER — ROCURONIUM BROMIDE 10 MG/ML
INJECTION, SOLUTION INTRAVENOUS
Status: DISCONTINUED | OUTPATIENT
Start: 2020-03-24 | End: 2020-03-24

## 2020-03-24 RX ORDER — HYDROMORPHONE HYDROCHLORIDE 1 MG/ML
0.2 INJECTION, SOLUTION INTRAMUSCULAR; INTRAVENOUS; SUBCUTANEOUS EVERY 5 MIN PRN
Status: DISCONTINUED | OUTPATIENT
Start: 2020-03-24 | End: 2020-03-24 | Stop reason: HOSPADM

## 2020-03-24 RX ORDER — OXYCODONE AND ACETAMINOPHEN 10; 325 MG/1; MG/1
1 TABLET ORAL EVERY 4 HOURS PRN
Status: DISCONTINUED | OUTPATIENT
Start: 2020-03-24 | End: 2020-03-25 | Stop reason: HOSPADM

## 2020-03-24 RX ORDER — OXYCODONE AND ACETAMINOPHEN 5; 325 MG/1; MG/1
1 TABLET ORAL EVERY 4 HOURS PRN
Status: DISCONTINUED | OUTPATIENT
Start: 2020-03-24 | End: 2020-03-25 | Stop reason: HOSPADM

## 2020-03-24 RX ORDER — SODIUM CHLORIDE 9 MG/ML
INJECTION, SOLUTION INTRAVENOUS CONTINUOUS
Status: DISCONTINUED | OUTPATIENT
Start: 2020-03-24 | End: 2020-03-25

## 2020-03-24 RX ORDER — DOCUSATE SODIUM 100 MG/1
100 CAPSULE, LIQUID FILLED ORAL 2 TIMES DAILY
Status: DISCONTINUED | OUTPATIENT
Start: 2020-03-24 | End: 2020-03-25 | Stop reason: HOSPADM

## 2020-03-24 RX ORDER — ONDANSETRON 8 MG/1
8 TABLET, ORALLY DISINTEGRATING ORAL EVERY 8 HOURS PRN
Status: DISCONTINUED | OUTPATIENT
Start: 2020-03-24 | End: 2020-03-25 | Stop reason: HOSPADM

## 2020-03-24 RX ORDER — CLONAZEPAM 0.5 MG/1
0.5 TABLET ORAL EVERY MORNING
Status: DISCONTINUED | OUTPATIENT
Start: 2020-03-25 | End: 2020-03-25 | Stop reason: HOSPADM

## 2020-03-24 RX ORDER — PROPOFOL 10 MG/ML
VIAL (ML) INTRAVENOUS
Status: DISCONTINUED | OUTPATIENT
Start: 2020-03-24 | End: 2020-03-24

## 2020-03-24 RX ORDER — MORPHINE SULFATE 15 MG/1
15 TABLET, FILM COATED, EXTENDED RELEASE ORAL 2 TIMES DAILY
Status: DISCONTINUED | OUTPATIENT
Start: 2020-03-24 | End: 2020-03-25 | Stop reason: HOSPADM

## 2020-03-24 RX ORDER — MIDAZOLAM HYDROCHLORIDE 1 MG/ML
INJECTION INTRAMUSCULAR; INTRAVENOUS
Status: DISCONTINUED | OUTPATIENT
Start: 2020-03-24 | End: 2020-03-24

## 2020-03-24 RX ORDER — SIMETHICONE 80 MG
1 TABLET,CHEWABLE ORAL 3 TIMES DAILY PRN
Status: DISCONTINUED | OUTPATIENT
Start: 2020-03-24 | End: 2020-03-25 | Stop reason: HOSPADM

## 2020-03-24 RX ORDER — NEOSTIGMINE METHYLSULFATE 1 MG/ML
INJECTION, SOLUTION INTRAVENOUS
Status: DISCONTINUED | OUTPATIENT
Start: 2020-03-24 | End: 2020-03-24

## 2020-03-24 RX ORDER — ONDANSETRON HYDROCHLORIDE 2 MG/ML
INJECTION, SOLUTION INTRAMUSCULAR; INTRAVENOUS
Status: DISCONTINUED | OUTPATIENT
Start: 2020-03-24 | End: 2020-03-24

## 2020-03-24 RX ORDER — DEXAMETHASONE SODIUM PHOSPHATE 4 MG/ML
INJECTION, SOLUTION INTRA-ARTICULAR; INTRALESIONAL; INTRAMUSCULAR; INTRAVENOUS; SOFT TISSUE
Status: DISCONTINUED | OUTPATIENT
Start: 2020-03-24 | End: 2020-03-24

## 2020-03-24 RX ORDER — GLYCOPYRROLATE 0.2 MG/ML
INJECTION INTRAMUSCULAR; INTRAVENOUS
Status: DISCONTINUED | OUTPATIENT
Start: 2020-03-24 | End: 2020-03-24

## 2020-03-24 RX ORDER — HYDROXYZINE HYDROCHLORIDE 25 MG/1
25 TABLET, FILM COATED ORAL 2 TIMES DAILY
Status: DISCONTINUED | OUTPATIENT
Start: 2020-03-24 | End: 2020-03-25 | Stop reason: HOSPADM

## 2020-03-24 RX ORDER — PHENYLEPHRINE HYDROCHLORIDE 10 MG/ML
INJECTION INTRAVENOUS
Status: DISCONTINUED | OUTPATIENT
Start: 2020-03-24 | End: 2020-03-24

## 2020-03-24 RX ORDER — OXYBUTYNIN CHLORIDE 5 MG/1
5 TABLET ORAL 3 TIMES DAILY PRN
Status: DISCONTINUED | OUTPATIENT
Start: 2020-03-24 | End: 2020-03-25 | Stop reason: HOSPADM

## 2020-03-24 RX ORDER — LEVETIRACETAM 750 MG/1
750 TABLET ORAL 2 TIMES DAILY
Status: DISCONTINUED | OUTPATIENT
Start: 2020-03-24 | End: 2020-03-25 | Stop reason: HOSPADM

## 2020-03-24 RX ORDER — ACETAMINOPHEN 10 MG/ML
INJECTION, SOLUTION INTRAVENOUS
Status: DISCONTINUED | OUTPATIENT
Start: 2020-03-24 | End: 2020-03-24

## 2020-03-24 RX ORDER — HYDROMORPHONE HYDROCHLORIDE 1 MG/ML
INJECTION, SOLUTION INTRAMUSCULAR; INTRAVENOUS; SUBCUTANEOUS
Status: COMPLETED
Start: 2020-03-24 | End: 2020-03-24

## 2020-03-24 RX ORDER — CEFAZOLIN SODIUM 1 G/3ML
2 INJECTION, POWDER, FOR SOLUTION INTRAMUSCULAR; INTRAVENOUS
Status: COMPLETED | OUTPATIENT
Start: 2020-03-24 | End: 2020-03-24

## 2020-03-24 RX ORDER — CLONAZEPAM 1 MG/1
1 TABLET ORAL NIGHTLY
Status: DISCONTINUED | OUTPATIENT
Start: 2020-03-24 | End: 2020-03-25 | Stop reason: HOSPADM

## 2020-03-24 RX ORDER — FENTANYL CITRATE 50 UG/ML
25 INJECTION, SOLUTION INTRAMUSCULAR; INTRAVENOUS EVERY 5 MIN PRN
Status: COMPLETED | OUTPATIENT
Start: 2020-03-24 | End: 2020-03-24

## 2020-03-24 RX ORDER — PANTOPRAZOLE SODIUM 40 MG/1
40 TABLET, DELAYED RELEASE ORAL DAILY
Status: DISCONTINUED | OUTPATIENT
Start: 2020-03-24 | End: 2020-03-25 | Stop reason: HOSPADM

## 2020-03-24 RX ORDER — SUCCINYLCHOLINE CHLORIDE 20 MG/ML
INJECTION INTRAMUSCULAR; INTRAVENOUS
Status: DISCONTINUED | OUTPATIENT
Start: 2020-03-24 | End: 2020-03-24

## 2020-03-24 RX ADMIN — FENTANYL CITRATE 25 MCG: 50 INJECTION INTRAMUSCULAR; INTRAVENOUS at 09:03

## 2020-03-24 RX ADMIN — HYDROXYZINE HYDROCHLORIDE 25 MG: 25 TABLET, FILM COATED ORAL at 11:03

## 2020-03-24 RX ADMIN — CEFAZOLIN 2 G: 330 INJECTION, POWDER, FOR SOLUTION INTRAMUSCULAR; INTRAVENOUS at 07:03

## 2020-03-24 RX ADMIN — GLYCOPYRROLATE 0.4 MG: 0.2 INJECTION, SOLUTION INTRAMUSCULAR; INTRAVENOUS at 09:03

## 2020-03-24 RX ADMIN — OXYCODONE AND ACETAMINOPHEN 1 TABLET: 10; 325 TABLET ORAL at 08:03

## 2020-03-24 RX ADMIN — SODIUM CHLORIDE, SODIUM GLUCONATE, SODIUM ACETATE, POTASSIUM CHLORIDE, MAGNESIUM CHLORIDE, SODIUM PHOSPHATE, DIBASIC, AND POTASSIUM PHOSPHATE: .53; .5; .37; .037; .03; .012; .00082 INJECTION, SOLUTION INTRAVENOUS at 07:03

## 2020-03-24 RX ADMIN — SUCCINYLCHOLINE CHLORIDE 120 MG: 20 INJECTION, SOLUTION INTRAMUSCULAR; INTRAVENOUS at 07:03

## 2020-03-24 RX ADMIN — HYDROMORPHONE HYDROCHLORIDE 0.2 MG: 1 INJECTION, SOLUTION INTRAMUSCULAR; INTRAVENOUS; SUBCUTANEOUS at 10:03

## 2020-03-24 RX ADMIN — SODIUM CHLORIDE: 0.9 INJECTION, SOLUTION INTRAVENOUS at 10:03

## 2020-03-24 RX ADMIN — OXYCODONE HYDROCHLORIDE AND ACETAMINOPHEN 1 TABLET: 5; 325 TABLET ORAL at 04:03

## 2020-03-24 RX ADMIN — ROCURONIUM BROMIDE 5 MG: 10 INJECTION, SOLUTION INTRAVENOUS at 07:03

## 2020-03-24 RX ADMIN — MUPIROCIN: 20 OINTMENT TOPICAL at 07:03

## 2020-03-24 RX ADMIN — ONDANSETRON 4 MG: 2 INJECTION, SOLUTION INTRAMUSCULAR; INTRAVENOUS at 09:03

## 2020-03-24 RX ADMIN — FENTANYL CITRATE 50 MCG: 50 INJECTION, SOLUTION INTRAMUSCULAR; INTRAVENOUS at 09:03

## 2020-03-24 RX ADMIN — OXYBUTYNIN CHLORIDE 5 MG: 5 TABLET ORAL at 01:03

## 2020-03-24 RX ADMIN — CLONAZEPAM 1 MG: 1 TABLET ORAL at 08:03

## 2020-03-24 RX ADMIN — DOCUSATE SODIUM 100 MG: 100 CAPSULE, LIQUID FILLED ORAL at 08:03

## 2020-03-24 RX ADMIN — MORPHINE SULFATE 15 MG: 15 TABLET, EXTENDED RELEASE ORAL at 11:03

## 2020-03-24 RX ADMIN — DEXAMETHASONE SODIUM PHOSPHATE 8 MG: 4 INJECTION, SOLUTION INTRAMUSCULAR; INTRAVENOUS at 07:03

## 2020-03-24 RX ADMIN — HYDROMORPHONE HYDROCHLORIDE 0.2 MG: 1 INJECTION, SOLUTION INTRAMUSCULAR; INTRAVENOUS; SUBCUTANEOUS at 09:03

## 2020-03-24 RX ADMIN — FENTANYL CITRATE 100 MCG: 50 INJECTION, SOLUTION INTRAMUSCULAR; INTRAVENOUS at 07:03

## 2020-03-24 RX ADMIN — LEVETIRACETAM 750 MG: 750 TABLET ORAL at 08:03

## 2020-03-24 RX ADMIN — NEOSTIGMINE METHYLSULFATE 3 MG: 1 INJECTION INTRAVENOUS at 09:03

## 2020-03-24 RX ADMIN — MORPHINE SULFATE 15 MG: 15 TABLET, EXTENDED RELEASE ORAL at 08:03

## 2020-03-24 RX ADMIN — LEVETIRACETAM 750 MG: 750 TABLET ORAL at 11:03

## 2020-03-24 RX ADMIN — LIDOCAINE HYDROCHLORIDE 60 MG: 20 INJECTION, SOLUTION INTRAVENOUS at 07:03

## 2020-03-24 RX ADMIN — PANTOPRAZOLE SODIUM 40 MG: 40 TABLET, DELAYED RELEASE ORAL at 11:03

## 2020-03-24 RX ADMIN — PROPOFOL 150 MG: 10 INJECTION, EMULSION INTRAVENOUS at 07:03

## 2020-03-24 RX ADMIN — SERTRALINE HYDROCHLORIDE 50 MG: 50 TABLET ORAL at 08:03

## 2020-03-24 RX ADMIN — PHENYLEPHRINE HYDROCHLORIDE 100 MCG: 10 INJECTION INTRAVENOUS at 08:03

## 2020-03-24 RX ADMIN — OXYBUTYNIN CHLORIDE 5 MG: 5 TABLET ORAL at 08:03

## 2020-03-24 RX ADMIN — SODIUM CHLORIDE: 0.9 INJECTION, SOLUTION INTRAVENOUS at 07:03

## 2020-03-24 RX ADMIN — ACETAMINOPHEN 1000 MG: 10 INJECTION, SOLUTION INTRAVENOUS at 07:03

## 2020-03-24 RX ADMIN — ROCURONIUM BROMIDE 45 MG: 10 INJECTION, SOLUTION INTRAVENOUS at 07:03

## 2020-03-24 RX ADMIN — HYDROXYZINE HYDROCHLORIDE 25 MG: 25 TABLET, FILM COATED ORAL at 08:03

## 2020-03-24 RX ADMIN — MIDAZOLAM HYDROCHLORIDE 2 MG: 1 INJECTION, SOLUTION INTRAMUSCULAR; INTRAVENOUS at 07:03

## 2020-03-24 NOTE — HPI
Daniella Acosta is a 49 y.o. who is now status post RALH/BSO for the treatment of a pelvic mass. Her PMHx is complicated by h/o GBM (s/p brain surgery) and h/o DVT.

## 2020-03-24 NOTE — PROGRESS NOTES
03/24/20 1120 03/24/20 1121   Vital Signs   Temp (!) 94.2 °F (34.6 °C) (!) 94.4 °F (34.7 °C)   Temp src Oral Axillary     MD notified of abnormal temp. Bear hugger warmer in place. Will continue to monitor.

## 2020-03-24 NOTE — OPERATIVE NOTE ADDENDUM
Certification of Assistant at Surgery       Surgery Date: 3/24/2020     Participating Surgeons:  Surgeon(s) and Role:     * Curtis Zarate MD - Primary     * Rosy Agudelo MD - Resident - Assisting    Procedures:  Procedure(s) (LRB):  XI ROBOTIC HYSTERECTOMY (N/A)  XI ROBOTIC SALPINGO-OOPHORECTOMY (Bilateral)    Assistant Surgeon's Certification of Necessity:  I understand that section 1842 (b) (6) (d) of the Social Security Act generally prohibits Medicare Part B reasonable charge payment for the services of assistants at surgery in teaching hospitals when qualified residents are available to furnish such services. I certify that the services for which payment is claimed were medically necessary, and that no qualified resident was available to perform the services. I further understand that these services are subject to post-payment review by the Medicare carrier.      Tyra Singletary NP    03/24/2020  10:43 AM

## 2020-03-24 NOTE — ANESTHESIA PREPROCEDURE EVALUATION
03/24/2020  Daniella Acosta is a 49 y.o., female.      Patient Active Problem List   Diagnosis    Pelvic mass in female    Elevated CA-125    Acute deep vein thrombosis (DVT) of femoral vein of left lower extremity    Pelvic mass     Past Surgical History:   Procedure Laterality Date    APPENDECTOMY      BRAIN SURGERY      FLUOROSCOPY  3/5/2020    Procedure: Fluoroscopy;  Surgeon: Michael Enamorado MD;  Location: [facility] CATH LAB;  Service: General;;    INSERTION OF INFERIOR VENA CAVAL FILTER N/A 3/5/2020    Procedure: IVC filter placement;  Surgeon: Michael Enamorado MD;  Location: [facility] CATH [facility];  Service: General;  Laterality: N/A;       Anesthesia Evaluation    I have reviewed the Patient Summary Reports.    I have reviewed the Nursing Notes.   I have reviewed the Medications.     Review of Systems      Physical Exam  General:  Well nourished    Airway/Jaw/Neck:  Airway Findings: Mouth Opening: Normal General Airway Assessment: Adult  Mallampati: II  Improves to II with phonation.  Jaw/Neck Findings:  Limited Ability to Prognath  Neck ROM: Normal ROM     Eyes/Ears/Nose:  Eyes/Ears/Nose Findings:    Dental:  Dental Findings: In tact   Chest/Lungs:  Chest/Lungs Findings: Clear to auscultation, Normal Respiratory Rate     Heart/Vascular:  Heart Findings: Rate: Normal  Rhythm: Regular Rhythm  Sounds: Normal     Abdomen:  Abdomen Findings:  Normal     Musculoskeletal:  Musculoskeletal Findings:    Skin:  Skin Findings:     Mental Status:  Mental Status Findings:  Cooperative, Alert and Oriented         Anesthesia Plan  Type of Anesthesia, risks & benefits discussed:  Anesthesia Type:  general, MAC  Patient's Preference:   Intra-op Monitoring Plan:   Intra-op Monitoring Plan Comments:   Post Op Pain Control Plan:   Post Op Pain Control Plan Comments:   Induction:   IV  Beta Blocker:  Patient is not  currently on a Beta-Blocker (No further documentation required).       Informed Consent: Patient understands risks and agrees with Anesthesia plan.  Questions answered. Anesthesia consent signed with patient.  ASA Score: 3     Day of Surgery Review of History & Physical:    H&P update referred to the surgeon.         Ready For Surgery From Anesthesia Perspective.

## 2020-03-24 NOTE — TELEPHONE ENCOUNTER
Returned call to patient's mother. Requests assistance in getting medication to the patient who had surgery this morning. No visitor policy is being strictly enforced. Mrs. Acosta advised to seek assistance at the information desk for medication to be sent by courrier to patient room. Verbalized understanding of instructions given and will contact us for any further needs.      ----- Message from Lisha Davis sent at 3/24/2020  1:24 PM CDT -----  Contact: ptv mother  Pt mother called and she would like to be able to see her daughter     Pt mother can be reached at [facility] [phone] ms laura acosta room  [phone]

## 2020-03-24 NOTE — PROGRESS NOTES
Afternoon Assessment:    Patient states that she is doing well. Her pain is currently controlled. She is tolerating liquids and is looking forward to lunch. She denies nausea/vomiting.     Temp:  [94.2 °F (34.6 °C)-97.6 °F (36.4 °C)] 97.4 °F (36.3 °C)  Pulse:  [58-81] 58  Resp:  [11-19] 15  SpO2:  [93 %-100 %] 93 %  BP: (111-125)/(71-84) 122/76    PE:  Gen: Alert, oriented. Bear hugger in place.  Abdomen: Soft, non-distended, non-tender. Hypoactive bowel sounds. Steri strips in place over 5 port sites and are clean and dry.  MSK: Sergio/SCDs in place  : Reid in place draining clear, yellow urine    A/P:  - T 94.2F upon arriving to floor; now 97.4 with bear hugger on   - Vascular surgery consulted for recs re: blackened 3rd toe on L foot  - Continue home MS contin and percocet for pain control  - SCDs encouraged  - Routine post-op advances  - Restart home coumadin tomorrow    Rosy Agudelo MD  OBGYN, PGY-2

## 2020-03-24 NOTE — NURSING TRANSFER
Nursing Transfer Note      3/24/2020     Transfer from PACU to 811    Transfer via bed    Transfer with IV    Transported by hospital transporter    Medicines sent: No    Chart send with patient: Yes    Notified: Team notified    Patient reassessed at: 1120. Vitals and assessment as charted; see progress note for details. Skin dry and clean. Lap sites dry/clean. L toe black. Generalize bruising noted.     Upon arrival to floor: Pt oriented to room, call light, and floor. Bed locked an in lowest position. Belongings brought from downstairs to bedside. Will continue to monitor.

## 2020-03-24 NOTE — TRANSFER OF CARE
"Anesthesia Transfer of Care Note    Patient: Daniella Acosta    Procedure(s) Performed: Procedure(s) (LRB):  XI ROBOTIC HYSTERECTOMY (N/A)  XI ROBOTIC SALPINGO-OOPHORECTOMY (Bilateral)    Patient location: PACU    Anesthesia Type: general    Transport from OR: Transported from OR on 6-10 L/min O2 by face mask with adequate spontaneous ventilation    Post pain: adequate analgesia    Post assessment: no apparent anesthetic complications and tolerated procedure well    Post vital signs: stable    Level of consciousness: sedated and responds to stimulation    Nausea/Vomiting: no nausea/vomiting    Complications: none    Transfer of care protocol was followed      Last vitals:   Visit Vitals  /71 (BP Location: Right arm, Patient Position: Lying)   Pulse 68   Temp 36.4 °C (97.6 °F) (Temporal)   Resp 17   Ht 5' 5" (1.651 m)   Wt 56.7 kg (125 lb)   SpO2 100%   Breastfeeding? No   BMI 20.80 kg/m²     "

## 2020-03-24 NOTE — PROGRESS NOTES
Upon hourly rounding, it was noted that the pt was actively bleeding from two of of her lap sites; bleeding was minimal. Pressure was applied, bleeding controlled; pressure dressing applied. Pt hypothermic with temp = 94.4 but all other vitals stable. Bear hugger blanket replaced on pt. MD notified. No new orders at this time. Will continue to monitor.

## 2020-03-24 NOTE — OP NOTE
DATE OF PROCEDURE:  3/24/2020     SURGEON: Curtis Zarate    ASSISTANTS: Rosy Agudelo MD; Tyra Singletary NP (first assist)     PREOPERATIVE DIAGNOSES: Pelvic mass  Mildly elevated CA-125     POSTOPERATIVE DIAGNOSES: Same     PROCEDURES:  Robotic-assisted laparoscopic hysterectomy and bilateral   salpingo-oophorectomy.    This procedure was performed on an urgent basis due to the need to rule out cancer, as well as being s/p IVC filter placement and off of anticoagulation.     COMPLICATIONS: None     ESTIMATED BLOOD LOSS: 50 cc     ANESTHESIA: GETA     INTRAOPERATIVE FINDINGS:  4 cm uterus with what appeared to be an old blood clot in the pelvis.  Enlarged, but smooth left ovary.  Hemosiderin stains of the peritoneum.    PROCEDURE IN DETAIL: Informed consent was obtained and the patient was taken to   the Operating Suite.  General anesthesia was administered.  Once felt to be   adequate, she was placed in dorsal lithotomy position with her arms tucked.  The   abdomen and pelvis were prepped and draped in the usual fashion and a speculum   was placed in the vagina.  The cervix was visualized, grasped with a   single-tooth tenaculum and the uterus sounded to approximately 4 cm.    Serial dilation of cervix was performed and a small VCare manipulator was   placed without difficulty.  Reid catheter was placed to gravity drainage and   attention was turned to the abdominal portion of the procedure. A Veress needle was placed through the umbilicus and opening pressure was noted to be less than 4 .  Pneumoperitoneum was obtained with carbon dioxide and once this was felt to be adequate, an 8 mm incision was made and a robotic trocar was placed through this.  Intraperitoneal placement was confirmed with the camera.  Lateral robotic trocars x3 were   placed through 8 mm incisions.  A right upper quadrant 8 mm AirSeal accessory   port was placed.  The patient was placed in deep Trendelenburg.  The robot was   docked and  instruments were passed in the operative field. Attention was first turned to the left side.  Free fluid was collected, and because of the benign appearance of the mass and its size, I drained it with the suction . The left round ligament was transected after sacrificing with bipolar cautery.  The anterior and posterior leaflets of the broad ligament were opened.  Adhesions of the sigmoid as well as the ovary to the sidewall were managed with monopolar cautery.The pararectal space was developed.  The ureter was identified and a window was made beneath the infundibulopelvic ligament.  This was sacrificed with bipolar cautery and transected.  The medial fold of the peritoneum was then   taken to the uterosacrals.  Attention was turned to the right side, which was   taken down in an identical manner.  Anteriorly, the vesicouterine peritoneum was  incised and the bladder was mobilized inferiorly over the ring.  A 10 o'clock   to 2 o'clock colpotomy was performed.  Posteriorly, a 4 o'clock to 8 o'clock   colpotomy was performed and bilateral cardinal ligaments and uterine vessels   skeletonized of their peritoneal attachments, sacrificed with bipolar cautery   and transected.  Circumferential colpotomy was completed and the uterus, cervix,   bilateral tubes and ovaries were removed. The implant in the cul de sac was then removed from the pelvic peritoneum with monopolar cautery. The cuff was then closed with a 0 PDS suture tied in the midline.  The pelvis was irrigated and noted to be   Hemostatic.  Hemoblast was placed.  Once hemostasis was confirmed, the   instruments were removed, the robot was undocked and the pneumoperitoneum was   evacuated.  The patient was flattened.  All ports were removed and port sites   were inspected and made hemostatic with electrocautery and closed with   subcuticular 4-0 Monocryl suture.  Steri-Strips and pressure dressing were   applied and the patient was awoken and taken to  Recovery Room in stable   condition.  Of note, I was present for and performed all key aspects of   procedure.  Tyra Singletary's expertise was needed as there was no qualified   resident available.

## 2020-03-24 NOTE — H&P
The patient has been examined and the H&P has been reviewed:    I concur with the findings and no changes have occurred since H&P was written. The patient stopped taking coumadin 5 days ago.    Anesthesia/Surgery risks, benefits and alternative options discussed and understood by patient/family.    Rosy Agudelo MD  OBGYN, PGY-2      Subjective:      Patient ID: Daniella Acosta is a 49 y.o. female.     Chief Complaint: Consult (Pelvic Mass (Dr. Salgado))        HPI  Here today at the request of Dr. Salgado due to recently detected pelvic mass.  Was sen by him in December after being diagnosed with LLE DVT.  He felt a pelvic mass so ordered TVUS and CT.  Both demonstrated a cystic and solid mass in the pelvis approx 13 cm in size.  There was no ascites.  CA-125 was 93. No FH breast/ovarian/colon cancers.  LMP 2-3 years ago.  PSH open nick and brain surgery for GBM in 2010.  Has residual gait issues.  Currently on coumadin.  Denies pelvic pain.  Has no bladder or bowel issues.  Review of Systems   Constitutional: Negative for activity change, appetite change, chills, fatigue and fever.   HENT: Negative for hearing loss, mouth sores, nosebleeds, sore throat and tinnitus.    Eyes: Negative for visual disturbance.   Respiratory: Negative for cough, chest tightness, shortness of breath and wheezing.    Cardiovascular: Negative for chest pain and leg swelling.   Gastrointestinal: Negative for abdominal distention, abdominal pain, blood in stool, constipation, diarrhea, nausea and vomiting.   Genitourinary: Negative for dysuria, flank pain, frequency, hematuria, pelvic pain, vaginal bleeding, vaginal discharge and vaginal pain.   Musculoskeletal: Negative for arthralgias and back pain.   Skin: Negative for rash.   Neurological: Negative for dizziness, seizures, syncope, weakness and numbness.   Hematological: Does not bruise/bleed easily.   Psychiatric/Behavioral: Negative for confusion and sleep disturbance. The  patient is not nervous/anxious.               Past Medical History:   Diagnosis Date    DVT (deep venous thrombosis)      Seizure disorder              Past Surgical History:   Procedure Laterality Date    APPENDECTOMY        BRAIN SURGERY                Family History   Problem Relation Age of Onset    Hypertension Mother      Gout Father        Social History               Socioeconomic History    Marital status: Single       Spouse name: Not on file    Number of children: Not on file    Years of education: Not on file    Highest education level: Not on file   Occupational History    Not on file   Social Needs    Financial resource strain: Not on file    Food insecurity:       Worry: Not on file       Inability: Not on file    Transportation needs:       Medical: Not on file       Non-medical: Not on file   Tobacco Use    Smoking status: Former Smoker       Years: 10.00       Types: Cigarettes       Last attempt to quit: 1/27/2010       Years since quitting: 10.0    Smokeless tobacco: Never Used    Tobacco comment: Pt reports smoking approx 4 cigarettes/day   Substance and Sexual Activity    Alcohol use: Not Currently    Drug use: Not Currently    Sexual activity: Not on file   Lifestyle    Physical activity:       Days per week: Not on file       Minutes per session: Not on file    Stress: Not on file   Relationships    Social connections:       Talks on phone: Not on file       Gets together: Not on file       Attends [employer] service: Not on file       Active member of club or organization: Not on file       Attends meetings of clubs or organizations: Not on file       Relationship status: Not on file   Other Topics Concern    Not on file   Social History Narrative    Not on file              Current Outpatient Medications   Medication Sig    clonazePAM (KLONOPIN) 2 MG Tab Take by mouth 2 (two) times daily. Pt takes 1/2 tablet am and 1 tablet pm    dexAMETHasone (DECADRON) 2 MG  tablet Take 2 mg by mouth 2 (two) times daily.    ergocalciferol (ERGOCALCIFEROL) 50,000 unit Cap Take 1 capsule by mouth 2 (two) times daily.    hydrOXYzine HCl (ATARAX) 25 MG tablet Take 1 tablet by mouth 2 (two) times daily.    levETIRAcetam (KEPPRA) 750 MG Tab Take 750 mg by mouth 2 (two) times daily.    lysine (L-LYSINE) 500 mg Tab Take 1 tablet by mouth 2 (two) times daily.    morphine (MS CONTIN) 15 MG 12 hr tablet Take 1 tablet by mouth 2 (two) times daily.    multivitamin (THERAGRAN) per tablet Take 1 tablet by mouth 2 (two) times daily.    oxyCODONE-acetaminophen (PERCOCET)  mg per tablet Take 1 tablet by mouth 3 (three) times daily.    sertraline (ZOLOFT) 50 MG tablet Take 1 tablet by mouth 2 (two) times daily. Pt take 1.5 tablets in am and 1 tablet in pm    warfarin (COUMADIN) 1 MG tablet Take 3 tablets by mouth every Mon, Wed, Fri, Sun. Monday,Wed, Frid, Sun-TID Tues,Thurs,Sat-4x daily      No current facility-administered medications for this visit.            Review of patient's allergies indicates:   Allergen Reactions    Sulfa (sulfonamide antibiotics) Rash         Objective:   Physical Exam:   Constitutional: She is oriented to person, place, and time. She appears well-developed and well-nourished. No distress.    HENT:   Head: Normocephalic and atraumatic.    Eyes: No scleral icterus.    Neck: Normal range of motion. Neck supple.    Cardiovascular: Normal rate and intact distal pulses.  Exam reveals no cyanosis and no edema.     Pulmonary/Chest: Effort normal. No respiratory distress. She exhibits no tenderness.         Abdominal: Soft. Normal appearance. She exhibits mass. She exhibits no distension, no fluid wave and no ascites. There is no tenderness. There is no rigidity, no rebound and no guarding. No hernia.   Mass palpable in lower pelvis, mobile     Genitourinary: Rectum normal, vagina normal and uterus normal. Pelvic exam was performed with patient supine. There is no  rash, tenderness or lesion on the right labia. There is no rash, tenderness or lesion on the left labia. Uterus is not deviated, not enlarged, not fixed, not tender, not hosting fibroids and not experiencing uterine prolapse. Cervix is normal. Right adnexum displays no mass, no tenderness and no fullness. Left adnexum displays mass and fullness. Left adnexum displays no tenderness. No bleeding in the vagina. No vaginal discharge found. Labial bartholins normal.Cervix exhibits no motion tenderness, no discharge and no friability.   Genitourinary Comments: Mass smooth and mobile, does not feel fixed           Musculoskeletal: Normal range of motion and moves all extremeties. She exhibits no edema.      Lymphadenopathy:     She has no cervical adenopathy.        Right: No inguinal adenopathy present.        Left: No inguinal adenopathy present.    Neurological: She is alert and oriented to person, place, and time.    Skin: Skin is warm. No rash noted. No cyanosis or erythema.    Psychiatric: She has a normal mood and affect. Thought content normal.         Assessment:      1. Pelvic mass in female    2. Elevated CA-125    3. Acute deep vein thrombosis (DVT) of femoral vein of left lower extremity          Plan:       Explained exam and radiologic findings to the patient and her mother.  Given size, DVT due to vein compression, and elevated CA-125, I recommend excision.  I think we can do this robotically, and counseled them as such.  Since she is , plan RALH/BSO/Mass resection and possible staging. The risks, benefits, and indications of the procedure were discussed with the patient and her family member.  These included bleeding, infection, damage to surrounding tissues, conversion to laparotomy, and the possibility of major complications including death.  She voiced understanding, all questions were answered and consents were signed. Will arrange for her to have IVC filter placed prior to surgery.  Will also  discuss rationale behind coumadin versus Xarelto with her primary MD.

## 2020-03-24 NOTE — NURSING TRANSFER
Nursing Transfer Note      3/24/2020     Transfer To: 881    Transfer via bed    Transfer with radha    Transported by transport    Medicines sent: no    Chart send with patient: Yes    Notified: mother, texted

## 2020-03-24 NOTE — ANESTHESIA PROCEDURE NOTES
Intubation  Performed by: Ami Figueroa CRNA  Authorized by: Daniella Smith MD     Intubation:     Induction:  Intravenous    Intubated:  Postinduction    Attempts:  1    Attempted By:  CRNA    Method of Intubation:  Video laryngoscopy (Macgrath used for provider protection)    Blade:  Prabhu 3    Laryngeal View Grade: Grade I - full view of chords      Difficult Airway Encountered?: No      Complications:  None    Airway Device:  Oral endotracheal tube    Airway Device Size:  7.0    Style/Cuff Inflation:  Cuffed    Inflation Amount (mL):  6    Tube secured:  21    Secured at:  The lips    Placement Verified By:  Capnometry    Complicating Factors:  None    Findings Post-Intubation:  BS equal bilateral

## 2020-03-25 VITALS
HEIGHT: 65 IN | DIASTOLIC BLOOD PRESSURE: 75 MMHG | HEART RATE: 93 BPM | TEMPERATURE: 98 F | BODY MASS INDEX: 24.65 KG/M2 | SYSTOLIC BLOOD PRESSURE: 107 MMHG | WEIGHT: 147.94 LBS | OXYGEN SATURATION: 100 % | RESPIRATION RATE: 16 BRPM

## 2020-03-25 LAB — FINAL PATHOLOGIC DIAGNOSIS: NORMAL

## 2020-03-25 PROCEDURE — 25000003 PHARM REV CODE 250: Performed by: STUDENT IN AN ORGANIZED HEALTH CARE EDUCATION/TRAINING PROGRAM

## 2020-03-25 RX ORDER — OXYCODONE AND ACETAMINOPHEN 5; 325 MG/1; MG/1
1 TABLET ORAL EVERY 6 HOURS PRN
Qty: 25 TABLET | Refills: 0 | Status: SHIPPED | OUTPATIENT
Start: 2020-03-25

## 2020-03-25 RX ORDER — OXYCODONE AND ACETAMINOPHEN 5; 325 MG/1; MG/1
1 TABLET ORAL EVERY 6 HOURS PRN
Qty: 25 TABLET | Refills: 0 | Status: SHIPPED | OUTPATIENT
Start: 2020-03-25 | End: 2020-03-25

## 2020-03-25 RX ADMIN — HYDROXYZINE HYDROCHLORIDE 25 MG: 25 TABLET, FILM COATED ORAL at 09:03

## 2020-03-25 RX ADMIN — OXYCODONE AND ACETAMINOPHEN 1 TABLET: 10; 325 TABLET ORAL at 12:03

## 2020-03-25 RX ADMIN — CLONAZEPAM 0.5 MG: 0.5 TABLET ORAL at 06:03

## 2020-03-25 RX ADMIN — OXYCODONE HYDROCHLORIDE AND ACETAMINOPHEN 1 TABLET: 5; 325 TABLET ORAL at 09:03

## 2020-03-25 RX ADMIN — LEVETIRACETAM 750 MG: 750 TABLET ORAL at 09:03

## 2020-03-25 RX ADMIN — SERTRALINE HYDROCHLORIDE 50 MG: 50 TABLET ORAL at 09:03

## 2020-03-25 RX ADMIN — DOCUSATE SODIUM 100 MG: 100 CAPSULE, LIQUID FILLED ORAL at 09:03

## 2020-03-25 RX ADMIN — MORPHINE SULFATE 15 MG: 15 TABLET, EXTENDED RELEASE ORAL at 09:03

## 2020-03-25 RX ADMIN — PANTOPRAZOLE SODIUM 40 MG: 40 TABLET, DELAYED RELEASE ORAL at 09:03

## 2020-03-25 RX ADMIN — OXYCODONE AND ACETAMINOPHEN 1 TABLET: 10; 325 TABLET ORAL at 05:03

## 2020-03-25 NOTE — SUBJECTIVE & OBJECTIVE
Interval History:   Patient resting comfortably. States that her pain is well-controlled with MS contin and percoet 10mg q6h overnight. She is tolerating PO without nausea/vomiting. She ambulated around her room yesterday. She has not yet voided spontaneously; Reid catheter is still in place. She denies flatus and BM.    Scheduled Meds:   clonazePAM  0.5 mg Oral QAM    clonazePAM  1 mg Oral QHS    docusate sodium  100 mg Oral BID    hydroxyzine HCL  25 mg Oral BID    levETIRAcetam  750 mg Oral BID    morphine  15 mg Oral BID    pantoprazole  40 mg Oral Daily    sertraline  50 mg Oral BID     Continuous Infusions:   sodium chloride 0.9% 75 mL/hr at 03/24/20 1010     PRN Meds:ondansetron, oxybutynin, oxyCODONE-acetaminophen, oxyCODONE-acetaminophen, promethazine (PHENERGAN) IVPB, simethicone    Review of patient's allergies indicates:   Allergen Reactions    Sulfa (sulfonamide antibiotics) Rash       Objective:     Vital Signs (Most Recent):  Temp: 97.9 °F (36.6 °C) (03/25/20 0334)  Pulse: 72 (03/25/20 0334)  Resp: 15 (03/25/20 0334)  BP: 121/80 (03/25/20 0334)  SpO2: 96 % (03/25/20 0334) Vital Signs (24h Range):  Temp:  [94.2 °F (34.6 °C)-98.3 °F (36.8 °C)] 97.9 °F (36.6 °C)  Pulse:  [58-96] 72  Resp:  [11-19] 15  SpO2:  [93 %-100 %] 96 %  BP: (111-129)/(70-87) 121/80     Weight: 67.1 kg (147 lb 14.9 oz)  Body mass index is 24.62 kg/m².    Intake/Output - Last 3 Shifts       03/23 0700 - 03/24 0659 03/24 0700 - 03/25 0659    P.O.  600    I.V. (mL/kg)  2181.3 (32.5)    Total Intake(mL/kg)  2781.3 (41.4)    Urine (mL/kg/hr)  1805 (1.1)    Total Output  1805    Net  +976.3                   Physical Exam:   Constitutional: She is oriented to person, place, and time. She appears well-developed and well-nourished. No distress.    HENT:   Head: Normocephalic and atraumatic.      Cardiovascular: Normal rate.     Pulmonary/Chest: Effort normal. No respiratory distress.        Abdominal: Soft. Bowel sounds are  normal. She exhibits abdominal incision (Steri strips in place over port sites and are clean and dry). There is no tenderness. There is no rebound and no guarding.     Genitourinary:   Genitourinary Comments: Reid in place draining clear, yellow urine           Musculoskeletal: Normal range of motion. She exhibits no tenderness.   SCDs in place       Neurological: She is alert and oriented to person, place, and time.    Skin: Skin is warm and dry.    Psychiatric: She has a normal mood and affect. Her behavior is normal.       Lines/Drains/Airways     Drain                 Urethral Catheter 03/24/20 0742 Non-latex;Straight-tip 16 Fr. less than 1 day          Peripheral Intravenous Line                 Peripheral IV - Single Lumen 03/05/20 1240 20 G Left Hand 19 days         Peripheral IV - Single Lumen 03/24/20 0711 18 G Left Forearm less than 1 day

## 2020-03-25 NOTE — ASSESSMENT & PLAN NOTE
- Discoloration and pain L toe for about 1 month  - Vascular surgery consulted for evaluation while inpatient, appreciate recommendations   - no intervention necessary at this time given palpable pedal pulses, normal LUIS, and normal toe pressures   - continue anticoagulation   - LLE duplex to be performed this AM

## 2020-03-25 NOTE — CONSULTS
[facility]  Vascular Surgery  Consult Note    Inpatient consult to Vascular Surgery  Consult performed by: Lino Anthony MD  Consult ordered by: Rosy Agudelo MD        Subjective:     Chief Complaint/Reason for Admission: L 2nd toe gangrene    History of Present Illness: 49 y F with large pelvic mass s/p robo assisted lap hysterectomy/BSO today. Has history of DVT for about 6 months, s/p IVC filter and coumadin. Her coumadin was held for this procedure. She was also noted to have a small ulcer on the tip of her 2nd toe.  This has progressed to dry gangrene of the distal half of her toe over the last 6 months.  Vascular surgery is now being consulted for management of this.  She reports significant pain with ambulation of her toe, but denies any claudication type symptoms.  She has no rest pain.  Her swelling has improved significantly since anticoagulation.     Medications Prior to Admission   Medication Sig Dispense Refill Last Dose    clonazePAM (KLONOPIN) 2 MG Tab Take by mouth 2 (two) times daily. Pt takes 1/2 tablet am and 1 tablet pm   3/24/2020 at 0600    dexAMETHasone (DECADRON) 2 MG tablet Take 2 mg by mouth 2 (two) times daily.   3/24/2020 at 0600    ergocalciferol (ERGOCALCIFEROL) 50,000 unit Cap Take 1 capsule by mouth 2 (two) times daily.   3/23/2020 at 0800    esomeprazole (NEXIUM) 20 MG capsule Take 20 mg by mouth before breakfast.   3/23/2020 at 0800    levETIRAcetam (KEPPRA) 750 MG Tab Take 750 mg by mouth 2 (two) times daily.   3/24/2020 at 0600    lysine (L-LYSINE) 500 mg Tab Take 1 tablet by mouth 2 (two) times daily.   3/23/2020 at 0800    morphine (MS CONTIN) 15 MG 12 hr tablet Take 1 tablet by mouth 2 (two) times daily.   3/24/2020 at 0600    multivitamin (THERAGRAN) per tablet Take 1 tablet by mouth 2 (two) times daily.   Past Week at Unknown time    oxyCODONE-acetaminophen (PERCOCET)  mg per tablet Take 1 tablet by mouth 3 (three) times daily. Take  if needed   3/23/2020 at 0800    sertraline (ZOLOFT) 50 MG tablet Take 1 tablet by mouth 2 (two) times daily. Pt take 1.5 tablets in am and 1 tablet in pm   3/24/2020 at 0600    warfarin (COUMADIN) 1 MG tablet Take 3 tablets by mouth every Mon, Wed, Fri, Sun. Monday,Wed, Frid, Sun-TID Tues,Thurs,Sat-4x daily   Last dose was 3/19/20 per dr monroy   Past Week at Unknown time    hydrOXYzine HCl (ATARAX) 25 MG tablet Take 1 tablet by mouth 2 (two) times daily.   Unknown at 2000       Review of patient's allergies indicates:   Allergen Reactions    Sulfa (sulfonamide antibiotics) Rash       Past Medical History:   Diagnosis Date    DVT (deep venous thrombosis)     Glioblastoma     Seizure disorder      Past Surgical History:   Procedure Laterality Date    APPENDECTOMY      BRAIN SURGERY      FLUOROSCOPY  3/5/2020    Procedure: Fluoroscopy;  Surgeon: Michael Enamorado MD;  Location: [facility] CATH LAB;  Service: General;;    INSERTION OF INFERIOR VENA CAVAL FILTER N/A 3/5/2020    Procedure: IVC filter placement;  Surgeon: Michael Enamorado MD;  Location: [facility] CATH LAB;  Service: General;  Laterality: N/A;     Family History     Problem Relation (Age of Onset)    Gout Father    Hypertension Mother        Tobacco Use    Smoking status: Former Smoker     Years: 10.00     Types: Cigarettes     Last attempt to quit: 1/27/2010     Years since quitting: 10.1    Smokeless tobacco: Never Used    Tobacco comment: Pt reports smoking approx 4 cigarettes/day   Substance and Sexual Activity    Alcohol use: Not Currently    Drug use: Not Currently    Sexual activity: Not on file     Review of Systems   All other systems reviewed and are negative.    Objective:     Vital Signs (Most Recent):  Temp: 97.9 °F (36.6 °C) (03/24/20 1548)  Pulse: 73 (03/24/20 1548)  Resp: 16 (03/24/20 1548)  BP: 121/70 (03/24/20 1548)  SpO2: 98 % (03/24/20 1548) Vital Signs (24h Range):  Temp:  [94.2 °F (34.6 °C)-97.9 °F (36.6 °C)] 97.9 °F (36.6  °C)  Pulse:  [58-81] 73  Resp:  [11-19] 16  SpO2:  [93 %-100 %] 98 %  BP: (111-125)/(70-84) 121/70     Weight: 67.1 kg (147 lb 14.9 oz)  Body mass index is 24.62 kg/m².    Physical Exam   Constitutional: She appears well-developed and well-nourished.   HENT:   Head: Normocephalic and atraumatic.   Eyes: Pupils are equal, round, and reactive to light. EOM are normal.   Neck: Normal range of motion. Neck supple.   Cardiovascular: Normal rate and regular rhythm.   Pulmonary/Chest: Effort normal. No respiratory distress.   Abdominal: Soft. She exhibits no distension.   Musculoskeletal:   L fem, pop, DP 2+  L 2nd toe with dry gangrene of the distal tip.  No fluctuance or erythema to indicate wet gangrene.  Distal foot appears dusky.  R fem, pop, DP 2+       Significant Labs:  BMP: No results for input(s): GLU, NA, K, CL, CO2, BUN, CREATININE, CALCIUM, MG in the last 48 hours.  CBC: No results for input(s): WBC, RBC, HGB, HCT, PLT, MCV, MCH, MCHC in the last 48 hours.    Significant Diagnostics:  I have reviewed all pertinent imaging results/findings within the past 24 hours.   LUIS: normal waveforms. No evidence of disease. Toe waveforms are flattened. Toe pressures 116    Assessment/Plan:     Gangrene of toe of left foot  49 y F with large pelvic mass s/p robo assisted lap hysterectomy/BSO today. Known LLE DVT s/p IVC filter and coumadin. Her coumadin was held for this procedure. Has dry gangrene of tip of 2nd toe that has slowly worsened. Etiology is likely emboli related to her malignancy    - no vascular intervention indicated at this time as she has palpable pedal pulses, normal ABIs, and normal toe pressures  - recommend resuming anticoagulation as soon as safely possible. Given her history of DVT, malignancy, and IVC filter, could develop phlegmasia if her DVT worsens  - no EKG in epic. Consider obtaining to r/o afib  - will obtain LLE duplex in AM        Thank you for your consult. I will follow-up with patient.  Please contact us if you have any additional questions.    Lino Anthony MD  Vascular Surgery  [facility]    Vascular Attending  Agree with above   Palpable L pedal pulses. Dry gangrene tip L 2nd toe - likely cholesterol-like emboli to digital arteries  Certainly, no need for L leg angio or any intervention.  ASA, statin rx and she knows to f/u with Podiatry in [place]    Meng Cantrell MD FACS  Vascular/Endovascular Surgery

## 2020-03-25 NOTE — ASSESSMENT & PLAN NOTE
- H/o GBM with resultant surgery and radiation  - Now with h/o seizures and memory loss  - Continue home keppra 750mg BID

## 2020-03-25 NOTE — PROGRESS NOTES
Discharge instructions and prescriptions given and explained to pt.  Pt. verbalized understanding with no further questions.  Peripheral IV D/C'd with catheter tip intact.  VS WDL.  Patient is awaiting escort services.      ROAD TEST  O=SpO2 100% on RA  A=Ambulating around room and hallway  D=IV D/C'd  T=Tolerating regular diet  E=Voids  S=Performs self care independently  T=Teaching on wounds care complete

## 2020-03-25 NOTE — PLAN OF CARE
Plan of care discussed with patient at start of shift. Free from falls and injuries. Bed alarm in use. Resting quietly with eyes closed. Respirations even, unlabored. Skin warm and dry. Pain managed with PRN narcotics. Denies nausea. Lap sites dry and intact. Vital signs stable this shift. Frequent checks for pain and safety maintained. Bed in lowest position, wheels locked, side rails up x's 2, call light in reach. Instructed to call for assistance as needed, verbalizes understanding. Will continue to monitor.

## 2020-03-25 NOTE — ASSESSMENT & PLAN NOTE
POD#1  - Routine post-op advances  - Continue home MS contin 15mg q12h and percocet prn   - D/C jorgensen and perform spontaneous voiding trial this AM  - UOP adequate, 70cc/hr overnight  - Encourage ambulation   - Regular diet  - Antiemetics prn nausea/vomiting

## 2020-03-25 NOTE — HOSPITAL COURSE
49 y F with large pelvic mass s/p robo assisted lap hysterectomy/BSO today. Has history of DVT for about 6 months, s/p IVC filter and coumadin. Her coumadin was held for this procedure. She was also noted to have a small ulcer on the tip of her 2nd toe.  This has progressed to dry gangrene of the distal half of her toe over the last 6 months.  Vascular surgery is now being consulted for management of this.  She reports significant pain with ambulation of her toe, but denies any claudication type symptoms.  She has no rest pain.  Her swelling has improved significantly since anticoagulation.

## 2020-03-25 NOTE — ASSESSMENT & PLAN NOTE
49 y F with large pelvic mass s/p robo assisted lap hysterectomy/BSO today. Known LLE DVT s/p IVC filter and coumadin. Her coumadin was held for this procedure. Has dry gangrene of tip of 2nd toe that has slowly worsened. Etiology is likely emboli related to her malignancy    - no vascular intervention indicated at this time as she has palpable pedal pulses, normal ABIs, and normal toe pressures  - recommend resuming anticoagulation as soon as safely possible. Given her history of DVT, malignancy, and IVC filter, could develop phlegmasia if her DVT worsens  - no EKG in epic. Consider obtaining to r/o afib  - will obtain LLE duplex in AM

## 2020-03-25 NOTE — ASSESSMENT & PLAN NOTE
- DVT of LLE diagnosed in 12/2019  - S/P IVC filter placement on 3/5/20  - Home coumadin held immediately after surgery; will restart today

## 2020-03-25 NOTE — DISCHARGE SUMMARY
[facility]  Gynecologic Oncology  Discharge Summary    Patient Name: Daniella Acosta  MRN: [id number]  Admission Date: 3/24/2020  Hospital Length of Stay: 1 days  Discharge Date and Time:  03/25/2020 10:38 AM  Attending Physician: Curtis Zarate MD   Discharging Provider: Rosy Agudelo MD  Primary Care Provider: Shaan Tidwell MD    HPI:   Daniella Acosta is a 49 y.o. who is now status post RALH/BSO for the treatment of a pelvic mass. Her PMHx is complicated by h/o GBM (s/p brain surgery) and h/o DVT.     Hospital Course:  03/25/2020: POD#1. NAEON. Pain controlled. Tolerating PO. Vascular surgery consulted yesterday for evaluation of blackened L toe. Discharge home today.    Procedure(s) (LRB):  XI ROBOTIC HYSTERECTOMY (N/A)  XI ROBOTIC SALPINGO-OOPHORECTOMY (Bilateral)     Consults (From admission, onward)        Status Ordering Provider     Inpatient consult to Vascular Surgery  Once     Provider:  (Not yet assigned)    ROSY Chen          Pending Diagnostic Studies:     Procedure Component Value Units Date/Time    Cytology, Fluid/Wash/Brush [[id number]] Collected:  03/24/20 0805    Order Status:  Sent Lab Status:  In process Updated:  03/24/20 1040    Specimen to Pathology, Surgery Gynecology and Obstetrics [[id number]] Collected:  03/24/20 0931    Order Status:  Sent Lab Status:  In process Updated:  03/24/20 1033        Final Active Diagnoses:    Diagnosis Date Noted POA    PRINCIPAL PROBLEM:  S/p RALH/BSO [Z90.710] 03/24/2020 No    Anxiety and depression [F41.9, F32.9] 03/24/2020 Yes    Glioblastoma multiforme [C71.9] 03/24/2020 Yes    Gangrene of toe of left foot [I96] 03/24/2020 Yes    Discoloration of skin of toe [L81.9]  Yes    Chronic deep vein thrombosis (DVT) of femoral vein of left lower extremity [I82.512] 01/27/2020 Yes      Problems Resolved During this Admission:    Diagnosis Date Noted Date Resolved POA    Preoperative testing  [Z01.818] 03/24/2020 03/24/2020 Not Applicable        Does this patient meet criteria for extended DVT prophylaxis? Yes, will resume coumadin today.    Discharged Condition: good    Disposition: Home or Self Care    Follow Up:  Follow-up Information     Curtis Zarate MD. Go on 4/20/2020.    Specialties:  Gynecologic Oncology, Gynecology, Oncology  Why:  Postoperative appointment  Contact information:  [address]  [facility] [address]  [phone]             Schedule an appointment as soon as possible for a visit with Podiatry.               Patient Instructions:      Comprehensive metabolic panel   Standing Status: Future Standing Exp. Date: 05/16/21     Diet Adult Regular     Pelvic Rest   Order Comments: Pelvic rest until follow up visit. Nothing in vagina -no sex, tampons, douching, etc.     Notify your health care provider if you experience any of the following:   Order Comments: Heavy vaginal bleeding saturating more than 1 pad per hr for at least consecutive 2 hrs.     Notify your health care provider if you experience any of the following:  redness, tenderness, or signs of infection (pain, swelling, redness, odor or green/yellow discharge around incision site)     Notify your health care provider if you experience any of the following:  severe uncontrolled pain     Notify your health care provider if you experience any of the following:  persistent nausea and vomiting or diarrhea     Notify your health care provider if you experience any of the following:  temperature >100.4     Type & Screen   Standing Status: Future Standing Exp. Date: 05/16/21     Medications:  Reconciled Home Medications:      Medication List      CHANGE how you take these medications    * oxyCODONE-acetaminophen  mg per tablet  Commonly known as:  PERCOCET  Take 1 tablet by mouth 3 (three) times daily. Take if needed  What changed:  Another medication with the same name was added. Make sure you understand how and when to  take each.     * oxyCODONE-acetaminophen 5-325 mg per tablet  Commonly known as:  PERCOCET  Take 1 tablet by mouth every 6 (six) hours as needed.  What changed:  You were already taking a medication with the same name, and this prescription was added. Make sure you understand how and when to take each.         * This list has 2 medication(s) that are the same as other medications prescribed for you. Read the directions carefully, and ask your doctor or other care provider to review them with you.            CONTINUE taking these medications    clonazePAM 2 MG Tab  Commonly known as:  KLONOPIN  Take by mouth 2 (two) times daily. Pt takes 1/2 tablet am and 1 tablet pm     dexAMETHasone 2 MG tablet  Commonly known as:  DECADRON  Take 2 mg by mouth 2 (two) times daily.     ergocalciferol 50,000 unit Cap  Commonly known as:  ERGOCALCIFEROL  Take 1 capsule by mouth 2 (two) times daily.     esomeprazole 20 MG capsule  Commonly known as:  NEXIUM  Take 20 mg by mouth before breakfast.     hydroxyzine HCL 25 MG tablet  Commonly known as:  ATARAX  Take 1 tablet by mouth 2 (two) times daily.     levETIRAcetam 750 MG Tab  Commonly known as:  KEPPRA  Take 750 mg by mouth 2 (two) times daily.     lysine 500 mg Tab  Commonly known as:  L-LYSINE  Take 1 tablet by mouth 2 (two) times daily.     morphine 15 MG 12 hr tablet  Commonly known as:  MS CONTIN  Take 1 tablet by mouth 2 (two) times daily.     multivitamin per tablet  Commonly known as:  THERAGRAN  Take 1 tablet by mouth 2 (two) times daily.     sertraline 50 MG tablet  Commonly known as:  ZOLOFT  Take 1 tablet by mouth 2 (two) times daily. Pt take 1.5 tablets in am and 1 tablet in pm     warfarin 1 MG tablet  Commonly known as:  COUMADIN  Take 3 tablets by mouth every Mon, Wed, Fri, Sun. Monday,Wed, Frid, Sun-TID Tues,Thurs,Sat-4x daily   Last dose was 3/19/20 per dr eliana Agudelo MD  Gynecologic Oncology  [facility]

## 2020-03-25 NOTE — SUBJECTIVE & OBJECTIVE
Medications Prior to Admission   Medication Sig Dispense Refill Last Dose    clonazePAM (KLONOPIN) 2 MG Tab Take by mouth 2 (two) times daily. Pt takes 1/2 tablet am and 1 tablet pm   3/24/2020 at 0600    dexAMETHasone (DECADRON) 2 MG tablet Take 2 mg by mouth 2 (two) times daily.   3/24/2020 at 0600    ergocalciferol (ERGOCALCIFEROL) 50,000 unit Cap Take 1 capsule by mouth 2 (two) times daily.   3/23/2020 at 0800    esomeprazole (NEXIUM) 20 MG capsule Take 20 mg by mouth before breakfast.   3/23/2020 at 0800    levETIRAcetam (KEPPRA) 750 MG Tab Take 750 mg by mouth 2 (two) times daily.   3/24/2020 at 0600    lysine (L-LYSINE) 500 mg Tab Take 1 tablet by mouth 2 (two) times daily.   3/23/2020 at 0800    morphine (MS CONTIN) 15 MG 12 hr tablet Take 1 tablet by mouth 2 (two) times daily.   3/24/2020 at 0600    multivitamin (THERAGRAN) per tablet Take 1 tablet by mouth 2 (two) times daily.   Past Week at Unknown time    oxyCODONE-acetaminophen (PERCOCET)  mg per tablet Take 1 tablet by mouth 3 (three) times daily. Take if needed   3/23/2020 at 0800    sertraline (ZOLOFT) 50 MG tablet Take 1 tablet by mouth 2 (two) times daily. Pt take 1.5 tablets in am and 1 tablet in pm   3/24/2020 at 0600    warfarin (COUMADIN) 1 MG tablet Take 3 tablets by mouth every Mon, Wed, Fri, Sun. Monday,Wed, Frid, Sun-TID Tues,Thurs,Sat-4x daily   Last dose was 3/19/20 per dr monroy   Past Week at Unknown time    hydrOXYzine HCl (ATARAX) 25 MG tablet Take 1 tablet by mouth 2 (two) times daily.   Unknown at 2000       Review of patient's allergies indicates:   Allergen Reactions    Sulfa (sulfonamide antibiotics) Rash       Past Medical History:   Diagnosis Date    DVT (deep venous thrombosis)     Glioblastoma     Seizure disorder      Past Surgical History:   Procedure Laterality Date    APPENDECTOMY      BRAIN SURGERY      FLUOROSCOPY  3/5/2020    Procedure: Fluoroscopy;  Surgeon: Michael Enamorado MD;   Location: [facility] CATH LAB;  Service: General;;    INSERTION OF INFERIOR VENA CAVAL FILTER N/A 3/5/2020    Procedure: IVC filter placement;  Surgeon: Michael Enamorado MD;  Location: [facility] CATH LAB;  Service: General;  Laterality: N/A;     Family History     Problem Relation (Age of Onset)    Gout Father    Hypertension Mother        Tobacco Use    Smoking status: Former Smoker     Years: 10.00     Types: Cigarettes     Last attempt to quit: 1/27/2010     Years since quitting: 10.1    Smokeless tobacco: Never Used    Tobacco comment: Pt reports smoking approx 4 cigarettes/day   Substance and Sexual Activity    Alcohol use: Not Currently    Drug use: Not Currently    Sexual activity: Not on file     Review of Systems   All other systems reviewed and are negative.    Objective:     Vital Signs (Most Recent):  Temp: 97.9 °F (36.6 °C) (03/24/20 1548)  Pulse: 73 (03/24/20 1548)  Resp: 16 (03/24/20 1548)  BP: 121/70 (03/24/20 1548)  SpO2: 98 % (03/24/20 1548) Vital Signs (24h Range):  Temp:  [94.2 °F (34.6 °C)-97.9 °F (36.6 °C)] 97.9 °F (36.6 °C)  Pulse:  [58-81] 73  Resp:  [11-19] 16  SpO2:  [93 %-100 %] 98 %  BP: (111-125)/(70-84) 121/70     Weight: 67.1 kg (147 lb 14.9 oz)  Body mass index is 24.62 kg/m².    Physical Exam   Constitutional: She appears well-developed and well-nourished.   HENT:   Head: Normocephalic and atraumatic.   Eyes: Pupils are equal, round, and reactive to light. EOM are normal.   Neck: Normal range of motion. Neck supple.   Cardiovascular: Normal rate and regular rhythm.   Pulmonary/Chest: Effort normal. No respiratory distress.   Abdominal: Soft. She exhibits no distension.   Musculoskeletal:   L fem, pop, DP 2+  L 2nd toe with dry gangrene of the distal tip.  No fluctuance or erythema to indicate wet gangrene.  Distal foot appears dusky.  R fem, pop, DP 2+       Significant Labs:  BMP: No results for input(s): GLU, NA, K, CL, CO2, BUN, CREATININE, CALCIUM, MG in the last 48 hours.  CBC: No  results for input(s): WBC, RBC, HGB, HCT, PLT, MCV, MCH, MCHC in the last 48 hours.    Significant Diagnostics:  I have reviewed all pertinent imaging results/findings within the past 24 hours.   LUIS: normal waveforms. No evidence of disease. Toe waveforms are flattened. Toe pressures 116

## 2020-03-25 NOTE — PROGRESS NOTES
[facility]  Gynecologic Oncology  Progress Note      Patient Name: Daniella Acosta  MRN: [id number]  Admission Date: 3/24/2020  Hospital Length of Stay: 1 days  Attending Provider: Curtis Zarate MD  Primary Care Provider: Shaan Tidwell MD  Principal Problem: Status post hysterectomy    Follow-up For: Procedure(s) (LRB):  XI ROBOTIC HYSTERECTOMY (N/A)  XI ROBOTIC SALPINGO-OOPHORECTOMY (Bilateral)  Post-Operative Day: 1 Day Post-Op  Subjective:      History of Present Illness:  Daniella Acosta is a 49 y.o. who is now status post RALH/BSO for the treatment of a pelvic mass. Her PMHx is complicated by h/o GBM (s/p brain surgery) and h/o DVT.     Hospital Course:  03/25/2020: POD#1. NAEON. Pain controlled. Tolerating PO. Vascular surgery consulted yesterday for evaluation of blackened L toe. Anticipate DC home later today.     Interval History:   Patient resting comfortably. States that her pain is well-controlled with MS contin and percocet 10mg q6h overnight. She is tolerating PO without nausea/vomiting. She ambulated around her room yesterday. She has not yet voided spontaneously; Reid catheter is still in place. She denies flatus and BM.    Scheduled Meds:   clonazePAM  0.5 mg Oral QAM    clonazePAM  1 mg Oral QHS    docusate sodium  100 mg Oral BID    hydroxyzine HCL  25 mg Oral BID    levETIRAcetam  750 mg Oral BID    morphine  15 mg Oral BID    pantoprazole  40 mg Oral Daily    sertraline  50 mg Oral BID     Continuous Infusions:   sodium chloride 0.9% 75 mL/hr at 03/24/20 1010     PRN Meds:ondansetron, oxybutynin, oxyCODONE-acetaminophen, oxyCODONE-acetaminophen, promethazine (PHENERGAN) IVPB, simethicone    Review of patient's allergies indicates:   Allergen Reactions    Sulfa (sulfonamide antibiotics) Rash       Objective:     Vital Signs (Most Recent):  Temp: 97.9 °F (36.6 °C) (03/25/20 0334)  Pulse: 72 (03/25/20 0334)  Resp: 15 (03/25/20 0334)  BP: 121/80  (03/25/20 0334)  SpO2: 96 % (03/25/20 0334) Vital Signs (24h Range):  Temp:  [94.2 °F (34.6 °C)-98.3 °F (36.8 °C)] 97.9 °F (36.6 °C)  Pulse:  [58-96] 72  Resp:  [11-19] 15  SpO2:  [93 %-100 %] 96 %  BP: (111-129)/(70-87) 121/80     Weight: 67.1 kg (147 lb 14.9 oz)  Body mass index is 24.62 kg/m².    Intake/Output - Last 3 Shifts       03/23 0700 - 03/24 0659 03/24 0700 - 03/25 0659    P.O.  600    I.V. (mL/kg)  2181.3 (32.5)    Total Intake(mL/kg)  2781.3 (41.4)    Urine (mL/kg/hr)  1805 (1.1)    Total Output  1805    Net  +976.3                   Physical Exam:   Constitutional: She is oriented to person, place, and time. She appears well-developed and well-nourished. No distress.    HENT:   Head: Normocephalic and atraumatic.      Cardiovascular: Normal rate.     Pulmonary/Chest: Effort normal. No respiratory distress.        Abdominal: Soft. Bowel sounds are normal. She exhibits abdominal incision (Steri strips in place over port sites and are clean and dry). There is no tenderness. There is no rebound and no guarding.     Genitourinary:   Genitourinary Comments: Jorgensen in place draining clear, yellow urine           Musculoskeletal: Normal range of motion. She exhibits no tenderness.   SCDs in place       Neurological: She is alert and oriented to person, place, and time.    Skin: Skin is warm and dry.    Psychiatric: She has a normal mood and affect. Her behavior is normal.       Lines/Drains/Airways     Drain                 Urethral Catheter 03/24/20 0742 Non-latex;Straight-tip 16 Fr. less than 1 day          Peripheral Intravenous Line                 Peripheral IV - Single Lumen 03/05/20 1240 20 G Left Hand 19 days         Peripheral IV - Single Lumen 03/24/20 0711 18 G Left Forearm less than 1 day              Assessment/Plan:     * S/p RALH/BSO  POD#1  - Routine post-op advances  - Continue home MS contin 15mg q12h and percocet prn   - D/C jorgensen and perform spontaneous voiding trial this AM  - UOP  adequate, 70cc/hr overnight  - Encourage ambulation   - Regular diet  - Antiemetics prn nausea/vomiting    Glioblastoma multiforme  - H/o GBM with resultant surgery and radiation  - Now with h/o seizures and memory loss  - Continue home keppra 750mg BID    Anxiety and depression  - Continue home sertraline and clonazepam    Discoloration of skin of toe  - Discoloration and pain L toe for about 1 month  - Vascular surgery consulted for evaluation while inpatient, appreciate recommendations   - no intervention necessary at this time given palpable pedal pulses, normal LUIS, and normal toe pressures   - continue anticoagulation   - LLE duplex to be performed this AM    Chronic deep vein thrombosis (DVT) of femoral vein of left lower extremity  - DVT of LLE diagnosed in 12/2019  - S/P IVC filter placement on 3/5/20  - Home coumadin held immediately after surgery; will restart today      VTE Risk Mitigation (From admission, onward)         Ordered     IP VTE HIGH RISK PATIENT  Once      03/24/20 1047     Place sequential compression device  Until discontinued      03/24/20 1047     Place SHERLYN hose  Until discontinued      03/24/20 1047                Was jorgensen catheter removed? Yes; catheter being removed as I left the room.    Rosy Agudelo MD  Gynecologic Oncology  [facility]

## 2020-03-26 NOTE — PLAN OF CARE
Future Appointments   Date Time Provider Department Center   4/20/2020  2:00 PM Curtis Zarate MD [facility] GYN ONC [facility] Tor Seo, RN, BSN, CM  Utilization Management  [facility]

## 2020-04-01 LAB
FINAL PATHOLOGIC DIAGNOSIS: NORMAL
GROSS: NORMAL
Lab: NORMAL

## 2020-04-03 NOTE — PHYSICIAN QUERY
PT Name: Daniella Acosta  MR #: [id number]    Physician Query Form - Pathology Findings Clarification     CDS/: JIHAN Rene,RNC-MNN        Contact information:[email or web address]  This form is a permanent document in the medical record.     Query Date: April 3, 2020      By submitting this query, we are merely seeking further clarification of documentation.  Please utilize your independent clinical judgment when addressing the question(s) below.      The medical record contains the following:     Findings Supporting Clinical Information Location in Medical Record   1. RIGHT TUBE AND OVARY, TOTAL HYSTERECTOMY AND RIGHT SALPINGO-OOPHORECTOMY:  Uterine body with atrophic endometrium and adenomyosis of myometrium.  Cervix without significant pathologic changes.  Right ovary with multiple foci of endometriosis, some with cystic features.  2. LEFT OVARY AND FALLOPIAN TUBE, LEFT SALPINGO-OOPHORECTOMY:  High-grade ovarian adenocarcinoma (G3), predominantly with mixed clear cell and endometrioid type  features.  Tumor is confined to single ovary without surface involvement.  Background ovarian parenchyma with cystic endometriosis.  3. CUL DE SAC MASS, EXCISION:  Fragments of organizing hemorrhage with surrounding granulation tissue and xanthomatosis inflammation,  consistent with burned out endometrioma.  Negative for atypia or malignancy.                                             INTRAOPERATIVE FINDINGS:  4 cm uterus with what appeared to be an old blood clot in the pelvis.  Enlarged, but smooth left ovary.  Hemosiderin stains of the peritoneum.     PROCEDURES:  Robotic-assisted laparoscopic hysterectomy and bilateral   salpingo-oophorectoy Pathology report 3/24                                          Op note 3/24       Please document the clinical significance of the Pathologists findings of   1. RIGHT TUBE AND OVARY, TOTAL HYSTERECTOMY AND RIGHT SALPINGO-OOPHORECTOMY:  Uterine body with  atrophic endometrium and adenomyosis of myometrium.  Cervix without significant pathologic changes.  Right ovary with multiple foci of endometriosis, some with cystic features.  2. LEFT OVARY AND FALLOPIAN TUBE, LEFT SALPINGO-OOPHORECTOMY:  High-grade ovarian adenocarcinoma (G3), predominantly with mixed clear cell and endometrioid type  features.  Tumor is confined to single ovary without surface involvement.  Background ovarian parenchyma with cystic endometriosis.  3. CUL DE SAC MASS, EXCISION:  Fragments of organizing hemorrhage with surrounding granulation tissue and xanthomatosis inflammation,  consistent with burned out endometrioma.  Negative for atypia or malignancy.    [ x ] I agree with the Pathology Findings   [   ] I do not agree with the Pathology Findings   [   ] Other/Clarification of Findings:   [   ] Clinically Insignificant   [  ] Clinically Undetermined       Please document in your progress notes daily for the duration of treatment until resolved and include in your discharge summary.

## 2020-04-07 NOTE — ANESTHESIA POSTPROCEDURE EVALUATION
Anesthesia Post Evaluation    Patient: Daniella Acosta    Procedure(s) Performed: Procedure(s) (LRB):  XI ROBOTIC HYSTERECTOMY (N/A)  XI ROBOTIC SALPINGO-OOPHORECTOMY (Bilateral)    Final Anesthesia Type: general    Patient location during evaluation: PACU  Patient participation: Yes- Able to Participate  Level of consciousness: awake and alert and oriented  Pain management: adequate  Airway patency: patent    PONV status at discharge: No PONV  Anesthetic complications: no      Cardiovascular status: blood pressure returned to baseline and hemodynamically stable  Respiratory status: unassisted  Hydration status: euvolemic  Follow-up not needed.          Event Time     Out of Recovery 10:56:48          Pain/Juan José Score: No data recorded

## 2020-04-20 ENCOUNTER — TELEPHONE (OUTPATIENT)
Dept: GYNECOLOGIC ONCOLOGY | Facility: CLINIC | Age: 50
End: 2020-04-20

## 2020-04-24 ENCOUNTER — OFFICE VISIT (OUTPATIENT)
Dept: GYNECOLOGIC ONCOLOGY | Facility: CLINIC | Age: 50
End: 2020-04-24
Payer: MEDICARE

## 2020-04-24 DIAGNOSIS — R19.00 PELVIC MASS IN FEMALE: ICD-10-CM

## 2020-04-24 DIAGNOSIS — I82.512 CHRONIC DEEP VEIN THROMBOSIS (DVT) OF FEMORAL VEIN OF LEFT LOWER EXTREMITY: Primary | ICD-10-CM

## 2020-04-24 DIAGNOSIS — Z51.11 ENCOUNTER FOR ANTINEOPLASTIC CHEMOTHERAPY: ICD-10-CM

## 2020-04-24 DIAGNOSIS — C56.2 OVARIAN CANCER ON LEFT: ICD-10-CM

## 2020-04-24 PROCEDURE — 99214 OFFICE O/P EST MOD 30 MIN: CPT | Mod: 24,95,, | Performed by: OBSTETRICS & GYNECOLOGY

## 2020-04-24 PROCEDURE — 99214 PR OFFICE/OUTPT VISIT, EST, LEVL IV, 30-39 MIN: ICD-10-PCS | Mod: 24,95,, | Performed by: OBSTETRICS & GYNECOLOGY

## 2020-04-24 NOTE — PROGRESS NOTES
Subjective:      Patient ID: Daniella Acosta is a 49 y.o. female.    Chief Complaint: No chief complaint on file.    The patient location is: home  The chief complaint leading to consultation is: ovarian cancer  Visit type: audiovisual  Total time spent with patient: 25 minutes  Each patient to whom he or she provides medical services by telemedicine is:  (1) informed of the relationship between the physician and patient and the respective role of any other health care provider with respect to management of the patient; and (2) notified that he or she may decline to receive medical services by telemedicine and may withdraw from such care at any time.    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  Has done well since surgery.  No complaints.  Denies VB, F/C, N/V.  Has normal bladder and bowel function.  Is scheduled to see vascular due to left LE vascular insufficiency and a dead pinky toe.  Review of Systems   Constitutional: Negative for activity change, appetite change, chills, fatigue and fever.   HENT: Negative for hearing loss, mouth sores, nosebleeds, sore throat and tinnitus.    Eyes: Negative for visual disturbance.   Respiratory: Negative for cough, chest tightness, shortness of breath and wheezing.    Cardiovascular: Negative for chest pain and leg swelling.   Gastrointestinal: Negative for abdominal distention, abdominal pain, blood in stool, constipation, diarrhea, nausea and vomiting.   Genitourinary: Negative for dysuria, flank pain, frequency, hematuria, pelvic pain, vaginal bleeding, vaginal discharge and vaginal pain.   Musculoskeletal: Negative for arthralgias and back pain.   Skin: Negative for rash.    Neurological: Negative for dizziness, seizures, syncope, weakness and numbness.   Hematological: Does not bruise/bleed easily.   Psychiatric/Behavioral: Negative for confusion and sleep disturbance. The patient is not nervous/anxious.        Objective:   Physical Exam:   Constitutional: She appears well-developed and well-nourished. No distress.    HENT:   Head: Normocephalic and atraumatic.    Eyes: No scleral icterus.    Neck: Normal range of motion. Neck supple.    Cardiovascular: Normal rate and intact distal pulses.  Exam reveals no cyanosis and no edema.     Pulmonary/Chest: Effort normal. No respiratory distress. She exhibits no tenderness.        Abdominal: She exhibits no distension (incisions healing well), no fluid wave, no ascites and no mass.                Lymphadenopathy:     She has no cervical adenopathy.     Skin: No cyanosis.        Assessment:     1. Chronic deep vein thrombosis (DVT) of femoral vein of left lower extremity    2. Pelvic mass in female    3. Ovarian cancer on left    4. Encounter for antineoplastic chemotherapy        Plan:       Doing well from a post-op standpoint.  Long discussion with her and her mother regarding her path demonstrating cancer without apparent spread.  Given high-grade nature of her tumor as well as 10 cm size, I recommended adjuvant therapy with Taxane/[place].  Will also need genetic testing.  Given her need for vascular f/u and some of her GBM related SE, will plan to have her treated in  at [facility] with Dr. Boone.  She and her mother voiced understanding.  All questions were answered and I made the referral to Dr. Boone.  I will see her back after C# at which point we can perform a pelvic exam.  IVC filter to be removed based on vascular recs.

## 2020-08-06 NOTE — TELEPHONE ENCOUNTER
Pt's mother, Inge, called and stated that the patient took coumadin on Saturday and she thought the patient needed to reschedule. I confirmed that that patient needs to be off coumadin for 5 days.  Mother agreeable to reschedule procedure on Thursday at 1pm.    
0

## 2021-01-01 ENCOUNTER — PATIENT MESSAGE (OUTPATIENT)
Dept: RESEARCH | Facility: HOSPITAL | Age: 51
End: 2021-01-01

## 2022-08-16 NOTE — HOSPITAL COURSE
03/25/2020: POD#1. JOEL. Pain controlled. Tolerating PO. Vascular surgery consulted yesterday for evaluation of blackened L toe. Discharge home today.   [Restricted in physically strenuous activity but ambulatory and able to carry out work of a light or sedentary nature] : Status 1- Restricted in physically strenuous activity but ambulatory and able to carry out work of a light or sedentary nature, e.g., light house work, office work [Normal] : normoactive bowel sounds, soft and nontender, no hepatosplenomegaly or masses appreciated [Mucositis] : no mucositis [Thrush] : no thrush [Vesicles] : no vesicles [de-identified] : EOMI, anicteric  [de-identified] : normal respiratory effort, no audible breath sounds

## (undated) DEVICE — SOL CLEARIFY VISUALIZATION LAP

## (undated) DEVICE — DRAPE ARM DAVINCI XI

## (undated) DEVICE — PACK CATH LAB CUSTOM BR

## (undated) DEVICE — CONTRAST OMNIPAQUE 240 50ML

## (undated) DEVICE — COVER LIGHT HANDLE

## (undated) DEVICE — SEAL UNIVERSAL 5MM-8MM XI

## (undated) DEVICE — Device

## (undated) DEVICE — PORT ACCESS 8MM W/120MM LOW

## (undated) DEVICE — SOL ELECTROLUBE ANTI-STIC

## (undated) DEVICE — LEGGINGS 48X31 INCH

## (undated) DEVICE — TRAY MINOR GEN SURG

## (undated) DEVICE — GUIDEWIRE AMPLATZ .035X260 SS

## (undated) DEVICE — SET TRI-LUMEN FILTERED TUBE

## (undated) DEVICE — DRAPE COLUMN DAVINCI XI

## (undated) DEVICE — APPLICATOR HEMOBLAST LAPSCP

## (undated) DEVICE — DRAPE ABDOMINAL TIBURON 14X11

## (undated) DEVICE — SEE MEDLINE ITEM 154981

## (undated) DEVICE — BAG INZII TISS RETRV 12/15MM

## (undated) DEVICE — LUBRICANT SURGILUBE 2 OZ

## (undated) DEVICE — GLOVE PROTEXIS NEOPRENE 7.5

## (undated) DEVICE — OBTURATOR BLADELESS 8MM XI

## (undated) DEVICE — BLADE SURG CARBON STEEL SZ11

## (undated) DEVICE — BELLOW CANN HEMOBLAST 1.65GR

## (undated) DEVICE — DRAPE SCOPE PILLOW WARMER

## (undated) DEVICE — KIT ANTIFOG

## (undated) DEVICE — GLOVE PROTEXIS LTX PF SURG 7.5

## (undated) DEVICE — TRAY FOLEY 16FR INFECTION CONT

## (undated) DEVICE — IRRIGATOR ENDOSCOPY DISP.

## (undated) DEVICE — MANIPULATOR VCARE PLUS 32MM SM

## (undated) DEVICE — COVER TIP CURVED SCISSORS XI

## (undated) DEVICE — SUT PDS II 0 CT-1 VIL MONO

## (undated) DEVICE — CLOSURE SKIN STERI STRIP 1/2X4

## (undated) DEVICE — ELECTRODE REM PLYHSV RETURN 9

## (undated) DEVICE — SUT MONOCYRL 4-0 PS2 UND

## (undated) DEVICE — NDL INSUF ULTRA VERESS 120MM

## (undated) DEVICE — PAD ABD 8X10 STERILE

## (undated) DEVICE — SEE MEDLINE ITEM 157181